# Patient Record
(demographics unavailable — no encounter records)

---

## 2025-01-29 NOTE — PHYSICAL EXAM
[Capable of only limited self care, confined to bed or chair more than 50% of waking hours] : Status 3- Capable of only limited self care, confined to bed or chair more than 50% of waking hours [Normal] : grossly intact [de-identified] : Seen on the wheelchair, however able to walk in the hallway with assistance [de-identified] : Wearing spine support

## 2025-01-29 NOTE — PHYSICAL EXAM
[Capable of only limited self care, confined to bed or chair more than 50% of waking hours] : Status 3- Capable of only limited self care, confined to bed or chair more than 50% of waking hours [Normal] : grossly intact [de-identified] : Seen on the wheelchair, however able to walk in the hallway with assistance [de-identified] : Wearing spine support

## 2025-01-29 NOTE — BEGINNING OF VISIT
[0] : 2) Feeling down, depressed, or hopeless: Not at all (0) [PHQ-2 Negative] : PHQ-2 Negative [PHQ-9 Negative] : PHQ-9 Negative [Pain Scale: ___] : On a scale of 1-10, today the patient's pain is a(n) [unfilled]. [Former] : Former [[id number]] : 0 [de-identified] : Pt has stopped smoking about a month ago

## 2025-01-29 NOTE — PHYSICAL EXAM
[Capable of only limited self care, confined to bed or chair more than 50% of waking hours] : Status 3- Capable of only limited self care, confined to bed or chair more than 50% of waking hours [Normal] : grossly intact [de-identified] : Seen on the wheelchair, however able to walk in the hallway with assistance [de-identified] : Wearing spine support

## 2025-01-29 NOTE — BEGINNING OF VISIT
[0] : 2) Feeling down, depressed, or hopeless: Not at all (0) [PHQ-2 Negative] : PHQ-2 Negative [PHQ-9 Negative] : PHQ-9 Negative [Pain Scale: ___] : On a scale of 1-10, today the patient's pain is a(n) [unfilled]. [Former] : Former [[id number]] : 0 [de-identified] : Pt has stopped smoking about a month ago

## 2025-01-29 NOTE — HISTORY OF PRESENT ILLNESS
[de-identified] : Ms. Kassi Cifuentes is 67 y/o female with right upper lung adenocarcinoma here for hospital follow up  Patient with hx of intracranial aneurysm (diag ) s/p clipping at [facility], active smoker who presented at [facility] ED 2024 c/o back pain and difficulty ambulating.  2024 CT Lumbar Age indeterminate mild anterior wedge compression fracture deformity of T11. Mild retropulsion of the posterosuperior corner of the vertebral body into the spinal canal. 4.6 cm indeterminate low-attenuation lesion within the spleen. A neoplastic lesion is not excluded. Recommend a dedicated contrast-enhanced abdomen and pelvis CT study for further evaluation.  1/3/2025 CT C/A/P showed 2.4 x 3.7 cm right upper lobe cavitary mass with irregular nodular wall thickening concerning for malignancy. Infection less likely however is not excluded. 1 cm left upper lobe spiculated nodule suspicious for malignancy (3:47). Emphysema.Mild right hilar adenopathy indeterminate for malignancy.4.3 x 3.1 x 3.7 cm septated splenic cyst.Constipated colon. Colonic diverticulosis without diverticulitis. Age-indeterminate T11 compression fracture with 3 to 4 mm osseous retropulsion, possibly acute on chronic.   2025 CT Thoracic 1.   T11 vertebral body compression fracture deformity with 3-4 mm retropulsion and loss of 50% of vertebral body height, indeterminate in age but possibly acute on chronic. 2.   Incompletely visualized irregular consolidation density parenchymal  opacity in the right upper lobe an incompletely visualized nodular  consolidation density in the left upper lobe. Recommend chest CT for further  evaluation.  1/3/2025 bone scan Focal uptake across vertebral body T11, corresponding to the compression deformity seen on recent CT. Of note, fractures on bone scan can demonstrate uptake for many months.  2025 CT head No CT evidence of intracranial metastasis.  2025  VERTREBRAL BODY, T11, BIOPSY: 	-  Bone with reactive changes of the marrow and evidence of bone remodeling 	-  Negative for carcinoma Negative: Pancytokeratin, cytokeratin 7 and TTF1   2025 A. LUNG, RIGHT UPPER LOBE, BIOPSY: Poorly-differentiated carcinoma, most favored adenocarcinoma of lung origin.   Immunoprofile of carcinoma (A1): P63 and P40: Patchy to no staining of carcinoma, respectively TTF1: Patchy strong staining of carcinoma   Patient is a 65-year-old female with a PMHx of hypothyroidism and HLD who presented to [facility] in the emergency room due to severe back pain that led to the incidental findings of her lung mass on 25. Patient cannot recall how she was referred to hematology-oncology. Patient is a poor historian.  Menarche: 14 years old  Menopause: Patient stated her menopause at an early age of 37.  G2,P0 Hysterectomy: Never  Oophorectomy: Never  Oral Contraceptives: 18 years old till about 36years old  Hormone Replacement Therapy: Last pap/pelvic exam: 35 years ago  Related family history:  Occupation: Customer service for [employer]   Genetic testing: Never   Diet: Regular everything but seafood  Previous Tx Hx: N/A Health Maintenance: Colonoscopy: 25 years ago  Endoscopy:  26 years ago  Mammo/US: 25 years ago Gyn: 25 years ago  DVTs: N/A Anticoagulant Hx: n/a Sleep Apnea: Yes  Recent Hospitalizations: 25 - 25 to a rehab facility ([facility] and nursing [place])  Vaccinations: Flu & Covid Vaccinated    Past med hx: T11 fracture    Past sug hx: Brain Aneurysm open brain surgery 10/2008  Related family history: Mother:  at 83yo. Patient doesn't remember cause of death  Father:  at 85 yo. Patient passed away from a PE Brother:  at 54 years old from colon cancer.  Brother:  at 68 years old from heart attack Cousin:  from lung cancer    Smokin/2 pack/day starting from when she was 15 years old until 24 until she was admitted and is now wearing a patch. ETOH: beer 1-2 cups daily  Illicit drug use: Never         [de-identified] : Patient is seen today for follow up She is accompanied by her friend Yasmine  She is currently at [facility] Reports that her rehab was delayed due to norovirus GI infection x 4 days  Lung, Right Upper Lobe, biopsy Poorly-differentiated carcinoma, mostly favored adenocarcnima of lung origin.   VERTREBRAL BODY, T11, BIOPSY: -  Bone with reactive changes of the marrow and evidence of bone remodeling -  Negative for carcinoma

## 2025-01-29 NOTE — PHYSICAL EXAM
[Capable of only limited self care, confined to bed or chair more than 50% of waking hours] : Status 3- Capable of only limited self care, confined to bed or chair more than 50% of waking hours [Normal] : grossly intact [de-identified] : Seen on the wheelchair, however able to walk in the hallway with assistance [de-identified] : Wearing spine support

## 2025-01-29 NOTE — HISTORY OF PRESENT ILLNESS
[de-identified] : Ms. Kassi Cifuentes is 65 y/o female with right upper lung adenocarcinoma here for hospital follow up  Patient with hx of intracranial aneurysm (diag ) s/p clipping at [facility], active smoker who presented at [facility] ED 2024 c/o back pain and difficulty ambulating.  2024 CT Lumbar Age indeterminate mild anterior wedge compression fracture deformity of T11. Mild retropulsion of the posterosuperior corner of the vertebral body into the spinal canal. 4.6 cm indeterminate low-attenuation lesion within the spleen. A neoplastic lesion is not excluded. Recommend a dedicated contrast-enhanced abdomen and pelvis CT study for further evaluation.  1/3/2025 CT C/A/P showed 2.4 x 3.7 cm right upper lobe cavitary mass with irregular nodular wall thickening concerning for malignancy. Infection less likely however is not excluded. 1 cm left upper lobe spiculated nodule suspicious for malignancy (3:47). Emphysema.Mild right hilar adenopathy indeterminate for malignancy.4.3 x 3.1 x 3.7 cm septated splenic cyst.Constipated colon. Colonic diverticulosis without diverticulitis. Age-indeterminate T11 compression fracture with 3 to 4 mm osseous retropulsion, possibly acute on chronic.   2025 CT Thoracic 1.   T11 vertebral body compression fracture deformity with 3-4 mm retropulsion and loss of 50% of vertebral body height, indeterminate in age but possibly acute on chronic. 2.   Incompletely visualized irregular consolidation density parenchymal  opacity in the right upper lobe an incompletely visualized nodular  consolidation density in the left upper lobe. Recommend chest CT for further  evaluation.  1/3/2025 bone scan Focal uptake across vertebral body T11, corresponding to the compression deformity seen on recent CT. Of note, fractures on bone scan can demonstrate uptake for many months.  2025 CT head No CT evidence of intracranial metastasis.  2025  VERTREBRAL BODY, T11, BIOPSY: 	-  Bone with reactive changes of the marrow and evidence of bone remodeling 	-  Negative for carcinoma Negative: Pancytokeratin, cytokeratin 7 and TTF1   2025 A. LUNG, RIGHT UPPER LOBE, BIOPSY: Poorly-differentiated carcinoma, most favored adenocarcinoma of lung origin.   Immunoprofile of carcinoma (A1): P63 and P40: Patchy to no staining of carcinoma, respectively TTF1: Patchy strong staining of carcinoma   Patient is a 65-year-old female with a PMHx of hypothyroidism and HLD who presented to [facility] in the emergency room due to severe back pain that led to the incidental findings of her lung mass on 25. Patient cannot recall how she was referred to hematology-oncology. Patient is a poor historian.  Menarche: 14 years old  Menopause: Patient stated her menopause at an early age of 37.  G2,P0 Hysterectomy: Never  Oophorectomy: Never  Oral Contraceptives: 18 years old till about 36years old  Hormone Replacement Therapy: Last pap/pelvic exam: 35 years ago  Related family history:  Occupation: Customer service for [employer]   Genetic testing: Never   Diet: Regular everything but seafood  Previous Tx Hx: N/A Health Maintenance: Colonoscopy: 25 years ago  Endoscopy:  26 years ago  Mammo/US: 25 years ago Gyn: 25 years ago  DVTs: N/A Anticoagulant Hx: n/a Sleep Apnea: Yes  Recent Hospitalizations: 25 - 25 to a rehab facility ([facility] and nursing [place])  Vaccinations: Flu & Covid Vaccinated    Past med hx: T11 fracture    Past sug hx: Brain Aneurysm open brain surgery 10/2008  Related family history: Mother:  at 83yo. Patient doesn't remember cause of death  Father:  at 85 yo. Patient passed away from a PE Brother:  at 54 years old from colon cancer.  Brother:  at 68 years old from heart attack Cousin:  from lung cancer    Smokin/2 pack/day starting from when she was 15 years old until 24 until she was admitted and is now wearing a patch. ETOH: beer 1-2 cups daily  Illicit drug use: Never         [de-identified] : Patient is seen today for follow up She is accompanied by her friend Yasmine  She is currently at [facility] Reports that her rehab was delayed due to norovirus GI infection x 4 days  Lung, Right Upper Lobe, biopsy Poorly-differentiated carcinoma, mostly favored adenocarcnima of lung origin.   VERTREBRAL BODY, T11, BIOPSY: -  Bone with reactive changes of the marrow and evidence of bone remodeling -  Negative for carcinoma

## 2025-01-29 NOTE — ASSESSMENT
[FreeTextEntry1] : Right upper lobe lung poorly differentiated adenocarcinoma NGS panel pending CT CAP [1/3/2025]: 3.7 cm right upper lobe cavitary mass, mild right hilar adenopathy.  Age indeterminate T11 compression fracture.  Left upper lobe spiculated nodule 1 cm CT head: No evidence of metastasis Cannot get MRI due to intracranial aneurysm clip  Discussed at length about the diagnosis, work up, staging, prognosis and treatment options Discussed at length about the finding, I explained to the patient that we need to biopsy the left lung mass for appropriate staging If the left lung mass is metastasis from the right lung adenocarcinoma, this would be stage IV disease which is incurable.  However if this is a second primary, she can possibly receive multimodality treatment to attempt to cure Patient reports that she would not undergo any further biopsies.  She reports that she had always thought that if she ever developed cancer she would let it be.  I explained to the patient about the advances in the field of thoracic oncology which is led to improvement in survival even for stage IV cancer. We briefly discussed about treatment options including chemotherapy, immunotherapy and targeted therapy I explained to her that she can always stage her cancer and know about the treatment options before decided to pursue them or not.  Even after she pursues the treatment if she changes her mind, we can discontinue treatment. Patient's friend at bedside tries to explain to the patient the same thing.  Later, the friend told me that patient is stubborn and eventually would do what she wants to do. At this point patient is agreeable to doing blood work today and obtaining PET scan. She will only agree to biopsy after the PET scan Plan: Send blood work including CBC, CMP, LDH, CEA, liquid biopsy Requested pathology to send NGS panel from the right lung biopsy PET/CT Smoking cessation counseled at length  Social history: Currently in [facility] rehab Lives alone in [place].  Has 2 sister 1 of whom lives nearby Smokin/2 pack/day starting from when she was 15 years old until 24 until she was admitted and is now wearing a patch. ETOH: beer 1-2 cups daily Illicit drug use: Never.  Patient and friend Yasmine had multiple questions which were answered to satisfaction  Follow-up in 7 to 10 days No labs

## 2025-01-29 NOTE — ASSESSMENT
[FreeTextEntry1] : Right upper lobe lung poorly differentiated adenocarcinoma NGS panel pending CT CAP [1/3/2025]: 3.7 cm right upper lobe cavitary mass, mild right hilar adenopathy.  Age indeterminate T11 compression fracture.  Left upper lobe spiculated nodule 1 cm CT head: No evidence of metastasis Cannot get MRI due to intracranial aneurysm clip  Discussed at length about the diagnosis, work up, staging, prognosis and treatment options Discussed at length about the finding, I explained to the patient that we need to biopsy the left lung mass for appropriate staging If the left lung mass is metastasis from the right lung adenocarcinoma, this would be stage IV disease which is incurable.  However if this is a second primary, she can possibly receive multimodality treatment to attempt to cure Patient reports that she would not undergo any further biopsies.  She reports that she had always thought that if she ever developed cancer she would let it be.  I explained to the patient about the advances in the field of thoracic oncology which is led to improvement in survival even for stage IV cancer. We briefly discussed about treatment options including chemotherapy, immunotherapy and targeted therapy I explained to her that she can always stage her cancer and know about the treatment options before decided to pursue them or not.  Even after she pursues the treatment if she changes her mind, we can discontinue treatment. Patient's friend at bedside tries to explain to the patient the same thing.  Later, the friend told me that patient is stubborn and eventually would do what she wants to do. At this point patient is agreeable to doing blood work today and obtaining PET scan. She will only agree to biopsy after the PET scan Plan: Send blood work including CBC, CMP, LDH, CEA, liquid biopsy Requested pathology to send NGS panel from the right lung biopsy PET/CT Smoking cessation counseled at length  Social history: Currently in [facility] rehab Lives alone in [place].  Has 2 sister 1 of whom lives nearby Smokin/2 pack/day starting from when she was 15 years old until 24 until she was admitted and is now wearing a patch. ETOH: beer 1-2 cups daily Illicit drug use: Never.  Patient and friend Yasmine had multiple questions which were answered to satisfaction  Follow-up in 7 to 10 days No labs

## 2025-01-29 NOTE — BEGINNING OF VISIT
[0] : 2) Feeling down, depressed, or hopeless: Not at all (0) [PHQ-2 Negative] : PHQ-2 Negative [PHQ-9 Negative] : PHQ-9 Negative [Pain Scale: ___] : On a scale of 1-10, today the patient's pain is a(n) [unfilled]. [Former] : Former [[id number]] : 0 [de-identified] : Pt has stopped smoking about a month ago

## 2025-01-29 NOTE — HISTORY OF PRESENT ILLNESS
[de-identified] : Ms. Kassi Cifuentes is 65 y/o female with right upper lung adenocarcinoma here for hospital follow up  Patient with hx of intracranial aneurysm (diag ) s/p clipping at [facility], active smoker who presented at [facility] ED 2024 c/o back pain and difficulty ambulating.  2024 CT Lumbar Age indeterminate mild anterior wedge compression fracture deformity of T11. Mild retropulsion of the posterosuperior corner of the vertebral body into the spinal canal. 4.6 cm indeterminate low-attenuation lesion within the spleen. A neoplastic lesion is not excluded. Recommend a dedicated contrast-enhanced abdomen and pelvis CT study for further evaluation.  1/3/2025 CT C/A/P showed 2.4 x 3.7 cm right upper lobe cavitary mass with irregular nodular wall thickening concerning for malignancy. Infection less likely however is not excluded. 1 cm left upper lobe spiculated nodule suspicious for malignancy (3:47). Emphysema.Mild right hilar adenopathy indeterminate for malignancy.4.3 x 3.1 x 3.7 cm septated splenic cyst.Constipated colon. Colonic diverticulosis without diverticulitis. Age-indeterminate T11 compression fracture with 3 to 4 mm osseous retropulsion, possibly acute on chronic.   2025 CT Thoracic 1.   T11 vertebral body compression fracture deformity with 3-4 mm retropulsion and loss of 50% of vertebral body height, indeterminate in age but possibly acute on chronic. 2.   Incompletely visualized irregular consolidation density parenchymal  opacity in the right upper lobe an incompletely visualized nodular  consolidation density in the left upper lobe. Recommend chest CT for further  evaluation.  1/3/2025 bone scan Focal uptake across vertebral body T11, corresponding to the compression deformity seen on recent CT. Of note, fractures on bone scan can demonstrate uptake for many months.  2025 CT head No CT evidence of intracranial metastasis.  2025  VERTREBRAL BODY, T11, BIOPSY: 	-  Bone with reactive changes of the marrow and evidence of bone remodeling 	-  Negative for carcinoma Negative: Pancytokeratin, cytokeratin 7 and TTF1   2025 A. LUNG, RIGHT UPPER LOBE, BIOPSY: Poorly-differentiated carcinoma, most favored adenocarcinoma of lung origin.   Immunoprofile of carcinoma (A1): P63 and P40: Patchy to no staining of carcinoma, respectively TTF1: Patchy strong staining of carcinoma   Patient is a 65-year-old female with a PMHx of hypothyroidism and HLD who presented to [facility] in the emergency room due to severe back pain that led to the incidental findings of her lung mass on 25. Patient cannot recall how she was referred to hematology-oncology. Patient is a poor historian.  Menarche: 14 years old  Menopause: Patient stated her menopause at an early age of 37.  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 Patient doesn't remember cause of death  Father:  at 85 yo. Patient passed away from a PE Brother:  at 54 years old from colon cancer.  Brother:  at 68 years old from heart attack Cousin:  from lung cancer    Smokin/2 pack/day starting from when she was 15 years old until 24 until she was admitted and is now wearing a patch. ETOH: beer 1-2 cups daily  Illicit drug use: Never         [de-identified] : Patient is seen today for follow up She is accompanied by her friend Yasmine  She is currently at [facility] Reports that her rehab was delayed due to norovirus GI infection x 4 days  Lung, Right Upper Lobe, biopsy Poorly-differentiated carcinoma, mostly favored adenocarcnima of lung origin.   VERTREBRAL BODY, T11, BIOPSY: -  Bone with reactive changes of the marrow and evidence of bone remodeling -  Negative for carcinoma

## 2025-01-29 NOTE — SOCIAL HISTORY
[Patient advised of risk of tobacco use and smoking cessation discussed.] : Patient advised of risk of tobacco use and smoking cessation discussed. English

## 2025-01-29 NOTE — ASSESSMENT
[FreeTextEntry1] : Right upper lobe lung poorly differentiated adenocarcinoma NGS panel pending CT CAP [1/3/2025]: 3.7 cm right upper lobe cavitary mass, mild right hilar adenopathy.  Age indeterminate T11 compression fracture.  Left upper lobe spiculated nodule 1 cm CT head: No evidence of metastasis Cannot get MRI due to intracranial aneurysm clip  Discussed at length about the diagnosis, work up, staging, prognosis and treatment options Discussed at length about the finding, I explained to the patient that we need to biopsy the left lung mass for appropriate staging If the left lung mass is metastasis from the right lung adenocarcinoma, this would be stage IV disease which is incurable.  However if this is a second primary, she can possibly receive multimodality treatment to attempt to cure Patient reports that she would not undergo any further biopsies.  She reports that she had always thought that if she ever developed cancer she would let it be.  I explained to the patient about the advances in the field of thoracic oncology which is led to improvement in survival even for stage IV cancer. We briefly discussed about treatment options including chemotherapy, immunotherapy and targeted therapy I explained to her that she can always stage her cancer and know about the treatment options before decided to pursue them or not.  Even after she pursues the treatment if she changes her mind, we can discontinue treatment. Patient's friend at bedside tries to explain to the patient the same thing.  Later, the friend told me that patient is stubborn and eventually would do what she wants to do. At this point patient is agreeable to doing blood work today and obtaining PET scan. She will only agree to biopsy after the PET scan Plan: Send blood work including CBC, CMP, LDH, CEA, liquid biopsy Requested pathology to send NGS panel from the right lung biopsy PET/CT Smoking cessation counseled at length  Social history: Currently in [facility] rehab Lives alone in [place].  Has 2 sister 1 of whom lives nearby Smokin/2 pack/day starting from when she was 15 years old until 24 until she was admitted and is now wearing a patch. ETOH: beer 1-2 cups daily Illicit drug use: Never.  Patient and friend Yasmine had multiple questions which were answered to satisfaction  Follow-up in 7 to 10 days No labs

## 2025-01-29 NOTE — BEGINNING OF VISIT
[0] : 2) Feeling down, depressed, or hopeless: Not at all (0) [PHQ-2 Negative] : PHQ-2 Negative [PHQ-9 Negative] : PHQ-9 Negative [Pain Scale: ___] : On a scale of 1-10, today the patient's pain is a(n) [unfilled]. [Former] : Former [[id number]] : 0 [de-identified] : Pt has stopped smoking about a month ago

## 2025-01-29 NOTE — HISTORY OF PRESENT ILLNESS
[de-identified] : Ms. Kassi Cifuentes is 65 y/o female with right upper lung adenocarcinoma here for hospital follow up  Patient with hx of intracranial aneurysm (diag ) s/p clipping at [facility], active smoker who presented at [place] [facility] 2024 c/o back pain and difficulty ambulating.  2024 CT Lumbar Age indeterminate mild anterior wedge compression fracture deformity of T11. Mild retropulsion of the posterosuperior corner of the vertebral body into the spinal canal. 4.6 cm indeterminate low-attenuation lesion within the spleen. A neoplastic lesion is not excluded. Recommend a dedicated contrast-enhanced abdomen and pelvis CT study for further evaluation.  1/3/2025 CT C/A/P showed 2.4 x 3.7 cm right upper lobe cavitary mass with irregular nodular wall thickening concerning for malignancy. Infection less likely however is not excluded. 1 cm left upper lobe spiculated nodule suspicious for malignancy (3:47). Emphysema.Mild right hilar adenopathy indeterminate for malignancy.4.3 x 3.1 x 3.7 cm septated splenic cyst.Constipated colon. Colonic diverticulosis without diverticulitis. Age-indeterminate T11 compression fracture with 3 to 4 mm osseous retropulsion, possibly acute on chronic.   2025 CT Thoracic 1.   T11 vertebral body compression fracture deformity with 3-4 mm retropulsion and loss of 50% of vertebral body height, indeterminate in age but possibly acute on chronic. 2.   Incompletely visualized irregular consolidation density parenchymal  opacity in the right upper lobe an incompletely visualized nodular  consolidation density in the left upper lobe. Recommend chest CT for further  evaluation.  1/3/2025 bone scan Focal uptake across vertebral body T11, corresponding to the compression deformity seen on recent CT. Of note, fractures on bone scan can demonstrate uptake for many months.  2025 CT head No CT evidence of intracranial metastasis.  2025  VERTREBRAL BODY, T11, BIOPSY: 	-  Bone with reactive changes of the marrow and evidence of bone remodeling 	-  Negative for carcinoma Negative: Pancytokeratin, cytokeratin 7 and TTF1   2025 A. LUNG, RIGHT UPPER LOBE, BIOPSY: Poorly-differentiated carcinoma, most favored adenocarcinoma of lung origin.   Immunoprofile of carcinoma (A1): P63 and P40: Patchy to no staining of carcinoma, respectively TTF1: Patchy strong staining of carcinoma   Patient is a 65-year-old female with a PMHx of hypothyroidism and HLD who presented to [facility] in the emergency room due to severe back pain that led to the incidental findings of her lung mass on 25. Patient cannot recall how she was referred to hematology-oncology. Patient is a poor historian.  Menarche: 14 years old  Menopause: Patient stated her menopause at an early age of 37.  G2,P0 Hysterectomy: Never  Oophorectomy: Never  Oral Contraceptives: 18 years old till about 36years old  Hormone Replacement Therapy: Last pap/pelvic exam: 35 years ago  Related family history:  Occupation: Customer service for [employer]   Genetic testing: Never   Diet: Regular everything but seafood  Previous Tx Hx: N/A Health Maintenance: Colonoscopy: 25 years ago  Endoscopy:  26 years ago  Mammo/US: 25 years ago Gyn: 25 years ago  DVTs: N/A Anticoagulant Hx: n/a Sleep Apnea: Yes  Recent Hospitalizations: 25 - 25 to a rehab facility ([facility] and nursing [place])  Vaccinations: Flu & Covid Vaccinated    Past med hx: T11 fracture    Past sug hx: Brain Aneurysm open brain surgery 10/2008  Related family history: Mother:  at 81yo. Patient doesn't remember cause of death  Father:  at 87 yo. Patient passed away from a PE Brother:  at 54 years old from colon cancer.  Brother:  at 68 years old from heart attack Cousin:  from lung cancer    Smokin/2 pack/day starting from when she was 15 years old until 24 until she was admitted and is now wearing a patch. ETOH: beer 1-2 cups daily  Illicit drug use: Never         [de-identified] : Patient is seen today for follow up She is accompanied by her friend Yasmine  She is currently at [facility] Reports that her rehab was delayed due to norovirus GI infection x 4 days  Lung, Right Upper Lobe, biopsy Poorly-differentiated carcinoma, mostly favored adenocarcnima of lung origin.   VERTREBRAL BODY, T11, BIOPSY: -  Bone with reactive changes of the marrow and evidence of bone remodeling -  Negative for carcinoma

## 2025-02-10 NOTE — HISTORY OF PRESENT ILLNESS
[FreeTextEntry1] : FATOUMATA SHEPHERD is a 65 year old female with a PMH of cerebral aneurysm clipping in 2008 (Dr. Roel Copeland at [facility]) presented with seizures, who presents to the office today for neurosurgical hospital follow up due to T11 compression fracture.   She was seen on 12/27/25 at [facility] after  TLSO brace placed 12/31. CT thoracic and lumbar spine shows T11 compression fracture with mild retropulsion. He underwent kyphoplasty and bone biopsy with Dr. Coronado on 1/6/25.  Surg Hx:    Meds:    Allergies: Iodine, tetracycline  Soc Hx: current smoker, ***EtOH, lives with ***, works as ***  65 year old female with PMHx of cerebral aneurysm clipping in 2008 (by Dr. Roel Copeland in [facility] in [place]), patient explains she presented with seizures at the time and was found to have an aneurysm and went to [facility] because she found a specialist. Says that she followed up after the incident to have staples removed but has not followed up since. She says that it is difficult for her to get to the city. She was on Keppra since her incident but had not had a seizure and keppra was stopped by her PCP. She says that she has been seeing a PCP yearly and currently in the process of switching PCPs but her last visit was March of last year. She also has history of hypothyroidism was previously on Synthroid, which was also stopped by her PCP. Patient is also a current smoker but denies any other medical problems. Patient says that she has been experiencing pain her back. Three days ago, she got up from a seated position on her cough and was in excruciating pain in the middle to lower part of her back. She doesn't recall any trauma or falls. Patient says she was never told she has osteoporosis. She recalls being offered DEXA by PCP but refused many years ago. She says that at baseline she is a little weaker with her LLE which has been the same since her aneurysm clipping. She is able to ambulate adequately at home and will occasionally use a cane if needed. Patient denies any numbness or tingling or changes in bowel or bladder symptoms. Says that she has not had a BM in a few days because the pain will not allow her to move around and she has not eaten much in the last few days. She denies any other complaints.

## 2025-02-10 NOTE — HISTORY OF PRESENT ILLNESS
[FreeTextEntry1] : FATOUMATA SHEPHERD is a 65 year old female with a PMH of cerebral aneurysm clipping in 2008 (Dr. Roel Copeland at ) presented with seizures, who presents to the office today for neurosurgical hospital follow up due to T11 compression fracture.   She was seen on 12/27/25 at [facility] after  TLSO brace placed 12/31. CT thoracic and lumbar spine shows T11 compression fracture with mild retropulsion. He underwent kyphoplasty and bone biopsy with Dr. Coronado on 1/6/25.  Surg Hx:    Meds:    Allergies: Iodine, tetracycline  Soc Hx: current smoker, ***EtOH, lives with ***, works as ***  65 year old female with PMHx of cerebral aneurysm clipping in 2008 (by Dr. Roel Copeland in [facility] in [place]), patient explains she presented with seizures at the time and was found to have an aneurysm and went to [facility] because she found a specialist. Says that she followed up after the incident to have staples removed but has not followed up since. She says that it is difficult for her to get to the city. She was on Keppra since her incident but had not had a seizure and keppra was stopped by her PCP. She says that she has been seeing a PCP yearly and currently in the process of switching PCPs but her last visit was March of last year. She also has history of hypothyroidism was previously on Synthroid, which was also stopped by her PCP. Patient is also a current smoker but denies any other medical problems. Patient says that she has been experiencing pain her back. Three days ago, she got up from a seated position on her cough and was in excruciating pain in the middle to lower part of her back. She doesn't recall any trauma or falls. Patient says she was never told she has osteoporosis. She recalls being offered DEXA by PCP but refused many years ago. She says that at baseline she is a little weaker with her LLE which has been the same since her aneurysm clipping. She is able to ambulate adequately at home and will occasionally use a cane if needed. Patient denies any numbness or tingling or changes in bowel or bladder symptoms. Says that she has not had a BM in a few days because the pain will not allow her to move around and she has not eaten much in the last few days. She denies any other complaints.

## 2025-03-05 NOTE — ASSESSMENT
[FreeTextEntry1] : Right upper lobe lung poorly differentiated adenocarcinoma PD-L1 70% NGS: [id number], CDKN2A, Tp53, STK 11 mutations.  TMB high.  FABI CT CAP [1/3/2025]: 3.7 cm right upper lobe cavitary mass, mild right hilar adenopathy.  Age indeterminate T11 compression fracture.  Left upper lobe spiculated nodule 1 cm CT head: No evidence of metastasis Cannot get MRI due to intracranial aneurysm clip  She is seen today for follow-up She is now back home and relatively better PET/CT [2025]: 4 cm right upper lobe lung mass, 1.2 cm right lower lobe nodule, 1 cm left lateral upper lobe nodule, 0.8 cm left lower lobe nodule.  No additional sites of pathological FDG uptake I explained to the patient that the bone lesions did not light up.  She has 4 nodules out of which right upper lobe and the left upper lobe are concerning for malignancy. The right lower lobe nodule and the left lower lobe nodule could be infectious versus inflammatory and malignancy cannot be ruled out Patient during her previous visit had expressed interest to not pursue any management. I offered her 2 Options biopsy BROOKS mass biopsy vs IOT and follow CT chest 3 months versus palliative care My recommendation would be to biopsy the left upper lobe mass and present her case in tumor board She is agreeable to pursuing left upper lobe mass biopsy Also reviewed the molecular testing and since she has high PD-L1, she would be a good candidate for checkpoint inhibitor therapy She understands Plan: Refer to IR for biopsy of the left upper lobe mass Present her case in multispecialty tumor board to discuss the right lower lobe and the left lower lobe nodules Smoking cessation counseled at length  Social history: Currently in [facility] rehab Lives alone in [place].  Has 2 sister 1 of whom lives nearby Smokin/2 pack/day starting from when she was 15 years old until 24 until she was admitted and is now wearing a patch. ETOH: beer 1-2 cups daily Illicit drug use: Never.  Patient and friend Yasmine had multiple questions which were answered to satisfaction  Follow-up after the left upper lobe mass biopsy No labs

## 2025-03-05 NOTE — PHYSICAL EXAM
[Capable of only limited self care, confined to bed or chair more than 50% of waking hours] : Status 3- Capable of only limited self care, confined to bed or chair more than 50% of waking hours [Normal] : grossly intact [de-identified] : Seen on the wheelchair, however able to walk in the hallway with assistance [de-identified] : Wearing spine support

## 2025-03-05 NOTE — PHYSICAL EXAM
[Capable of only limited self care, confined to bed or chair more than 50% of waking hours] : Status 3- Capable of only limited self care, confined to bed or chair more than 50% of waking hours [Normal] : grossly intact [de-identified] : Seen on the wheelchair, however able to walk in the hallway with assistance [de-identified] : Wearing spine support

## 2025-03-05 NOTE — ASSESSMENT
[FreeTextEntry1] : Right upper lobe lung poorly differentiated adenocarcinoma PD-L1 70% NGS: [id number], CDKN2A, Tp53, STK 11 mutations.  TMB high.  FABI CT CAP [1/3/2025]: 3.7 cm right upper lobe cavitary mass, mild right hilar adenopathy.  Age indeterminate T11 compression fracture.  Left upper lobe spiculated nodule 1 cm CT head: No evidence of metastasis Cannot get MRI due to intracranial aneurysm clip  She is seen today for follow-up She is now back home and relatively better PET/CT [2025]: 4 cm right upper lobe lung mass, 1.2 cm right lower lobe nodule, 1 cm left lateral upper lobe nodule, 0.8 cm left lower lobe nodule.  No additional sites of pathological FDG uptake I explained to the patient that the bone lesions did not light up.  She has 4 nodules out of which right upper lobe and the left upper lobe are concerning for malignancy. The right lower lobe nodule and the left lower lobe nodule could be infectious versus inflammatory and malignancy cannot be ruled out Patient during her previous visit had expressed interest to not pursue any management. I offered her 2 Options biopsy BROOKS mass biopsy vs IOT and follow CT chest 3 months versus palliative care My recommendation would be to biopsy the left upper lobe mass and present her case in tumor board She is agreeable to pursuing left upper lobe mass biopsy Also reviewed the molecular testing and since she has high PD-L1, she would be a good candidate for checkpoint inhibitor therapy She understands Plan: Refer to IR for biopsy of the left upper lobe mass Present her case in multispecialty tumor board to discuss the right lower lobe and the left lower lobe nodules Smoking cessation counseled at length  Social history: Currently in [facility] rehab Lives alone in [place].  Has 2 sister 1 of whom lives nearby Smokin/2 pack/day starting from when she was 15 years old until 24 until she was admitted and is now wearing a patch. ETOH: beer 1-2 cups daily Illicit drug use: Never.  Patient and friend Yasmine had multiple questions which were answered to satisfaction  Follow-up after the left upper lobe mass biopsy No labs

## 2025-03-05 NOTE — HISTORY OF PRESENT ILLNESS
[de-identified] : Ms. Kassi Cifuentes is 67 y/o female with right upper lung adenocarcinoma here for hospital follow up  Patient with hx of intracranial aneurysm (diag ) s/p clipping at [facility], active smoker who presented at [facility] 2024 c/o back pain and difficulty ambulating.  2024 CT Lumbar Age indeterminate mild anterior wedge compression fracture deformity of T11. Mild retropulsion of the posterosuperior corner of the vertebral body into the spinal canal. 4.6 cm indeterminate low-attenuation lesion within the spleen. A neoplastic lesion is not excluded. Recommend a dedicated contrast-enhanced abdomen and pelvis CT study for further evaluation.  1/3/2025 CT C/A/P showed 2.4 x 3.7 cm right upper lobe cavitary mass with irregular nodular wall thickening concerning for malignancy. Infection less likely however is not excluded. 1 cm left upper lobe spiculated nodule suspicious for malignancy (3:47). Emphysema.Mild right hilar adenopathy indeterminate for malignancy.4.3 x 3.1 x 3.7 cm septated splenic cyst.Constipated colon. Colonic diverticulosis without diverticulitis. Age-indeterminate T11 compression fracture with 3 to 4 mm osseous retropulsion, possibly acute on chronic.   2025 CT Thoracic 1.   T11 vertebral body compression fracture deformity with 3-4 mm retropulsion and loss of 50% of vertebral body height, indeterminate in age but possibly acute on chronic. 2.   Incompletely visualized irregular consolidation density parenchymal  opacity in the right upper lobe an incompletely visualized nodular  consolidation density in the left upper lobe. Recommend chest CT for further  evaluation.  1/3/2025 bone scan Focal uptake across vertebral body T11, corresponding to the compression deformity seen on recent CT. Of note, fractures on bone scan can demonstrate uptake for many months.  2025 CT head No CT evidence of intracranial metastasis.  2025  VERTREBRAL BODY, T11, BIOPSY: 	-  Bone with reactive changes of the marrow and evidence of bone remodeling 	-  Negative for carcinoma Negative: Pancytokeratin, cytokeratin 7 and TTF1   2025 A. LUNG, RIGHT UPPER LOBE, BIOPSY: Poorly-differentiated carcinoma, most favored adenocarcinoma of lung origin.   Immunoprofile of carcinoma (A1): P63 and P40: Patchy to no staining of carcinoma, respectively TTF1: Patchy strong staining of carcinoma   Patient is a 65-year-old female with a PMHx of hypothyroidism and HLD who presented to [facility] in the emergency room due to severe back pain that led to the incidental findings of her lung mass on 25. Patient cannot recall how she was referred to hematology-oncology. Patient is a poor historian.  Menarche: 14 years old  Menopause: Patient stated her menopause at an early age of 37.  G2,P0 Hysterectomy: Never  Oophorectomy: Never  Oral Contraceptives: 18 years old till about 36years old  Hormone Replacement Therapy: Last pap/pelvic exam: 35 years ago  Related family history:  Occupation: Customer service for [employer]   Genetic testing: Never   Diet: Regular everything but seafood  Previous Tx Hx: N/A Health Maintenance: Colonoscopy: 25 years ago  Endoscopy:  26 years ago  Mammo/US: 25 years ago Gyn: 25 years ago  DVTs: N/A Anticoagulant Hx: n/a Sleep Apnea: Yes  Recent Hospitalizations: 25 - 25 to a rehab facility ([facility] and nursing [place])  Vaccinations: Flu & Covid Vaccinated    Past med hx: T11 fracture    Past sug hx: Brain Aneurysm open brain surgery 10/2008  Related family history: Mother:  at 83yo. Patient doesn't remember cause of death  Father:  at 85 yo. Patient passed away from a PE Brother:  at 54 years old from colon cancer.  Brother:  at 68 years old from heart attack Cousin:  from lung cancer    Smokin/2 pack/day starting from when she was 15 years old until 24 until she was admitted and is now wearing a patch. ETOH: beer 1-2 cups daily  Illicit drug use: Never     Reports that her rehab was delayed due to norovirus GI infection x 4 days  Lung, Right Upper Lobe, biopsy Poorly-differentiated carcinoma, mostly favored adenocarcnima of lung origin.   VERTREBRAL BODY, T11, BIOPSY: -  Bone with reactive changes of the marrow and evidence of bone remodeling -  Negative for carcinoma      [de-identified] : Patient is seen today for follow up She is accompanied by her friend Yasmine  She is currently at home. Was released from [facility] 2/24/25 Pain is much better  PET SCAN 2/24/25: IMPRESSION:  1. Hypermetabolic 4 cm cavitary lung mass in the right upper lobe, corresponding to known malignancy. No hypermetabolic lymphadenopathy.  2. Hypermetabolic 1.0 cm spiculated lung nodule in the lateral left upper lobe, concerning for malignancy.  3. FDG uptake in a 1.2 cm right lower lobe nodule and a 0.8 cm left lower lobe nodule, both of which may represent infectious/inflammatory etiology, however malignancy cannot be excluded.  4. No additional sites of pathologic FDG uptake to suggest biologic tumor activity.

## 2025-03-05 NOTE — HISTORY OF PRESENT ILLNESS
[de-identified] : Ms. Kassi Cifuentes is 65 y/o female with right upper lung adenocarcinoma here for hospital follow up  Patient with hx of intracranial aneurysm (diag ) s/p clipping at [facility], active smoker who presented at [place] ED 2024 c/o back pain and difficulty ambulating.  2024 CT Lumbar Age indeterminate mild anterior wedge compression fracture deformity of T11. Mild retropulsion of the posterosuperior corner of the vertebral body into the spinal canal. 4.6 cm indeterminate low-attenuation lesion within the spleen. A neoplastic lesion is not excluded. Recommend a dedicated contrast-enhanced abdomen and pelvis CT study for further evaluation.  1/3/2025 CT C/A/P showed 2.4 x 3.7 cm right upper lobe cavitary mass with irregular nodular wall thickening concerning for malignancy. Infection less likely however is not excluded. 1 cm left upper lobe spiculated nodule suspicious for malignancy (3:47). Emphysema.Mild right hilar adenopathy indeterminate for malignancy.4.3 x 3.1 x 3.7 cm septated splenic cyst.Constipated colon. Colonic diverticulosis without diverticulitis. Age-indeterminate T11 compression fracture with 3 to 4 mm osseous retropulsion, possibly acute on chronic.   2025 CT Thoracic 1.   T11 vertebral body compression fracture deformity with 3-4 mm retropulsion and loss of 50% of vertebral body height, indeterminate in age but possibly acute on chronic. 2.   Incompletely visualized irregular consolidation density parenchymal  opacity in the right upper lobe an incompletely visualized nodular  consolidation density in the left upper lobe. Recommend chest CT for further  evaluation.  1/3/2025 bone scan Focal uptake across vertebral body T11, corresponding to the compression deformity seen on recent CT. Of note, fractures on bone scan can demonstrate uptake for many months.  2025 CT head No CT evidence of intracranial metastasis.  2025  VERTREBRAL BODY, T11, BIOPSY: 	-  Bone with reactive changes of the marrow and evidence of bone remodeling 	-  Negative for carcinoma Negative: Pancytokeratin, cytokeratin 7 and TTF1   2025 A. LUNG, RIGHT UPPER LOBE, BIOPSY: Poorly-differentiated carcinoma, most favored adenocarcinoma of lung origin.   Immunoprofile of carcinoma (A1): P63 and P40: Patchy to no staining of carcinoma, respectively TTF1: Patchy strong staining of carcinoma   Patient is a 65-year-old female with a PMHx of hypothyroidism and HLD who presented to [facility] in the emergency room due to severe back pain that led to the incidental findings of her lung mass on 25. Patient cannot recall how she was referred to hematology-oncology. Patient is a poor historian.  Menarche: 14 years old  Menopause: Patient stated her menopause at an early age of 37.  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 Patient doesn't remember cause of death  Father:  at 85 yo. Patient passed away from a PE Brother:  at 54 years old from colon cancer.  Brother:  at 68 years old from heart attack Cousin:  from lung cancer    Smokin/2 pack/day starting from when she was 15 years old until 24 until she was admitted and is now wearing a patch. ETOH: beer 1-2 cups daily  Illicit drug use: Never     Reports that her rehab was delayed due to norovirus GI infection x 4 days  Lung, Right Upper Lobe, biopsy Poorly-differentiated carcinoma, mostly favored adenocarcnima of lung origin.   VERTREBRAL BODY, T11, BIOPSY: -  Bone with reactive changes of the marrow and evidence of bone remodeling -  Negative for carcinoma      [de-identified] : Patient is seen today for follow up She is accompanied by her friend Yasmine  She is currently at home. Was released from [facility] 2/24/25 Pain is much better  PET SCAN 2/24/25: IMPRESSION:  1. Hypermetabolic 4 cm cavitary lung mass in the right upper lobe, corresponding to known malignancy. No hypermetabolic lymphadenopathy.  2. Hypermetabolic 1.0 cm spiculated lung nodule in the lateral left upper lobe, concerning for malignancy.  3. FDG uptake in a 1.2 cm right lower lobe nodule and a 0.8 cm left lower lobe nodule, both of which may represent infectious/inflammatory etiology, however malignancy cannot be excluded.  4. No additional sites of pathologic FDG uptake to suggest biologic tumor activity.

## 2025-03-25 NOTE — PHYSICAL EXAM
[Capable of only limited self care, confined to bed or chair more than 50% of waking hours] : Status 3- Capable of only limited self care, confined to bed or chair more than 50% of waking hours [Normal] : grossly intact [Ambulatory and capable of all self care but unable to carry out any work activities] : Status 2- Ambulatory and capable of all self care but unable to carry out any work activities. Up and about more than 50% of waking hours [de-identified] : Walks with cane [de-identified] : Wearing spine support

## 2025-03-25 NOTE — ASSESSMENT
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  TMB high.  FABI CT CAP [1/3/2025]: 3.7 cm right upper lobe cavitary mass, mild right hilar adenopathy.  Age indeterminate T11 compression fracture.  Left upper lobe spiculated nodule 1 cm CT head: No evidence of metastasis Cannot get MRI due to intracranial aneurysm clip PET/CT [2025]: 4 cm right upper lobe lung mass, 1.2 cm right lower lobe nodule, 1 cm left lateral upper lobe nodule, 0.8 cm left lower lobe nodule.  No additional sites of pathological FDG uptake  Left upper lobe adenocarcinoma PD-L1 3% NGS panel pending  I discussed the findings with pathology.  We reviewed the 2 lesions and histologically the 2 lesions look slightly different.  We will send NGS panel on the left upper lobe adenocarcinoma, and if it is not K-emiliana G 12C mutated, there likely 2 different primary. I explained to the patient that she likely has 2 different primaries.  However mutations do not deter mine primary versus metastatic disease I explained to the patient that the bone lesions did not light up.  She has 4 nodules out of which right upper lobe and the left upper lobe are malignant  The right lower lobe nodule and the left lower lobe nodule could be infectious versus inflammatory and malignancy cannot be ruled out Explained to the patient the best neck step would be to be seen by thoracic surgery and discussed about resection of the 2 lung cancers Patient is apprehensive about surgery and initially did not want to pursue it.  Extensive counseling done by myself and her friend at bedside I explained to the patient at least to have a visit with thoracic surgery to discuss the option, procedure, risks and recovery I explained to her that the only curative treatment for her lung cancer is surgery. If she were to decline surgery, we can consider chemotherapy plus checkpoint inhibitors but this may not be curable.  We can also consider local therapy to bilateral lung masses if she were a candidate including cryoablation versus radiation. She understands After back-and-forth, she was agreeable to seeing surgeon Plan: Refer to thoracic surgery Smoking cessation counseled at length  Social history: Currently in [facility] rehab Lives alone in [place].  Has 2 sister 1 of whom lives nearby Smokin/2 pack/day starting from when she was 15 years old until 12/27/24 until she was admitted and is now wearing a patch. ETOH: beer 1-2 cups daily Illicit drug use: Never.  Patient and friend Yasmine had multiple questions which were answered to satisfaction  Follow-up after the visit with thoracic surgery next  No labs

## 2025-03-25 NOTE — ASSESSMENT
[FreeTextEntry1] : Right upper lobe lung poorly differentiated adenocarcinoma PD-L1 70% NGS: [id number], CDKN2A, Tp53, STK 11 mutations.  TMB high.  FABI CT CAP [1/3/2025]: 3.7 cm right upper lobe cavitary mass, mild right hilar adenopathy.  Age indeterminate T11 compression fracture.  Left upper lobe spiculated nodule 1 cm CT head: No evidence of metastasis Cannot get MRI due to intracranial aneurysm clip PET/CT [2025]: 4 cm right upper lobe lung mass, 1.2 cm right lower lobe nodule, 1 cm left lateral upper lobe nodule, 0.8 cm left lower lobe nodule.  No additional sites of pathological FDG uptake  Left upper lobe adenocarcinoma PD-L1 3% NGS panel pending  I discussed the findings with pathology.  We reviewed the 2 lesions and histologically the 2 lesions look slightly different.  We will send NGS panel on the left upper lobe adenocarcinoma, and if it is not K-emiliana G 12C mutated, there likely 2 different primary. I explained to the patient that she likely has 2 different primaries.  However mutations do not deter mine primary versus metastatic disease I explained to the patient that the bone lesions did not light up.  She has 4 nodules out of which right upper lobe and the left upper lobe are malignant  The right lower lobe nodule and the left lower lobe nodule could be infectious versus inflammatory and malignancy cannot be ruled out Explained to the patient the best neck step would be to be seen by thoracic surgery and discussed about resection of the 2 lung cancers Patient is apprehensive about surgery and initially did not want to pursue it.  Extensive counseling done by myself and her friend at bedside I explained to the patient at least to have a visit with thoracic surgery to discuss the option, procedure, risks and recovery I explained to her that the only curative treatment for her lung cancer is surgery. If she were to decline surgery, we can consider chemotherapy plus checkpoint inhibitors but this may not be curable.  We can also consider local therapy to bilateral lung masses if she were a candidate including cryoablation versus radiation. She understands After back-and-forth, she was agreeable to seeing surgeon Plan: Refer to thoracic surgery Smoking cessation counseled at length  Social history: Currently in [facility] rehab Lives alone in [place].  Has 2 sister 1 of whom lives nearby Smokin/2 pack/day starting from when she was 15 years old until 12/27/24 until she was admitted and is now wearing a patch. ETOH: beer 1-2 cups daily Illicit drug use: Never.  Patient and friend Yasmine had multiple questions which were answered to satisfaction  Follow-up after the visit with thoracic surgery next  No labs

## 2025-03-25 NOTE — HISTORY OF PRESENT ILLNESS
[de-identified] : Ms. Kassi Cifuentes is 67 y/o female with right upper lung adenocarcinoma here for hospital follow up  Patient with hx of intracranial aneurysm (diag ) s/p clipping at [facility], active smoker who presented at [place] ED 2024 c/o back pain and difficulty ambulating.  2024 CT Lumbar Age indeterminate mild anterior wedge compression fracture deformity of T11. Mild retropulsion of the posterosuperior corner of the vertebral body into the spinal canal. 4.6 cm indeterminate low-attenuation lesion within the spleen. A neoplastic lesion is not excluded. Recommend a dedicated contrast-enhanced abdomen and pelvis CT study for further evaluation.  1/3/2025 CT C/A/P showed 2.4 x 3.7 cm right upper lobe cavitary mass with irregular nodular wall thickening concerning for malignancy. Infection less likely however is not excluded. 1 cm left upper lobe spiculated nodule suspicious for malignancy (3:47). Emphysema.Mild right hilar adenopathy indeterminate for malignancy.4.3 x 3.1 x 3.7 cm septated splenic cyst.Constipated colon. Colonic diverticulosis without diverticulitis. Age-indeterminate T11 compression fracture with 3 to 4 mm osseous retropulsion, possibly acute on chronic.   2025 CT Thoracic 1.   T11 vertebral body compression fracture deformity with 3-4 mm retropulsion and loss of 50% of vertebral body height, indeterminate in age but possibly acute on chronic. 2.   Incompletely visualized irregular consolidation density parenchymal  opacity in the right upper lobe an incompletely visualized nodular  consolidation density in the left upper lobe. Recommend chest CT for further  evaluation.  1/3/2025 bone scan Focal uptake across vertebral body T11, corresponding to the compression deformity seen on recent CT. Of note, fractures on bone scan can demonstrate uptake for many months.  2025 CT head No CT evidence of intracranial metastasis.  2025  VERTREBRAL BODY, T11, BIOPSY: 	-  Bone with reactive changes of the marrow and evidence of bone remodeling 	-  Negative for carcinoma Negative: Pancytokeratin, cytokeratin 7 and TTF1   2025 A. LUNG, RIGHT UPPER LOBE, BIOPSY: Poorly-differentiated carcinoma, most favored adenocarcinoma of lung origin.   Immunoprofile of carcinoma (A1): P63 and P40: Patchy to no staining of carcinoma, respectively TTF1: Patchy strong staining of carcinoma   Patient is a 65-year-old female with a PMHx of hypothyroidism and HLD who presented to [place] in the emergency room due to severe back pain that led to the incidental findings of her lung mass on 25. Patient cannot recall how she was referred to hematology-oncology. Patient is a poor historian.  Menarche: 14 years old  Menopause: Patient stated her menopause at an early age of 37.  G2,P0 Hysterectomy: Never  Oophorectomy: Never  Oral Contraceptives: 18 years old till about 36years old  Hormone Replacement Therapy: Last pap/pelvic exam: 35 years ago  Related family history:  Occupation: Customer service for [employer]   Genetic testing: Never   Diet: Regular everything but seafood  Previous Tx Hx: N/A Health Maintenance: Colonoscopy: 25 years ago  Endoscopy:  26 years ago  Mammo/US: 25 years ago Gyn: 25 years ago  DVTs: N/A Anticoagulant Hx: n/a Sleep Apnea: Yes  Recent Hospitalizations: 25 - 25 to a rehab facility ([facility] and nursing [place])  Vaccinations: Flu & Covid Vaccinated    Past med hx: T11 fracture    Past sug hx: Brain Aneurysm open brain surgery 10/2008  Related family history: Mother:  at 81yo. Patient doesn't remember cause of death  Father:  at 85 yo. Patient passed away from a PE Brother:  at 54 years old from colon cancer.  Brother:  at 68 years old from heart attack Cousin:  from lung cancer    Smokin/2 pack/day starting from when she was 15 years old until 24 until she was admitted and is now wearing a patch. ETOH: beer 1-2 cups daily  Illicit drug use: Never     Reports that her rehab was delayed due to norovirus GI infection x 4 days  Lung, Right Upper Lobe, biopsy Poorly-differentiated carcinoma, mostly favored adenocarcnima of lung origin.   VERTREBRAL BODY, T11, BIOPSY: -  Bone with reactive changes of the marrow and evidence of bone remodeling -  Negative for carcinoma      [de-identified] : Patient is seen today for follow up She is accompanied by her friend Yasmine  She is s/p lung biopsy CT guided with Dr. Costello. She is accompanied by her friend Yasmine Lung Biopsy 3/11/25: LEFT UPPER LOBE LUNG, BIOPSY: - Adenocarcinoma in a background of inflammatory fibrosis and fragments of benign alveolar tissue; see comment -Immunohistochemical stains TTF-1 and CK7 are positive in tumor cells. P40 is negative. -PD-L1 Immunohistochemistry Antibody: [place] PDL1 () Tissue type: Lung Block: A1 Result: Tumor Proportion Score (TPS*) 3% Interpretation: Positive

## 2025-03-25 NOTE — HISTORY OF PRESENT ILLNESS
[de-identified] : Ms. Kassi Cifuentes is 65 y/o female with right upper lung adenocarcinoma here for hospital follow up  Patient with hx of intracranial aneurysm (diag ) s/p clipping at [facility], active smoker who presented at [place] ED 2024 c/o back pain and difficulty ambulating.  2024 CT Lumbar Age indeterminate mild anterior wedge compression fracture deformity of T11. Mild retropulsion of the posterosuperior corner of the vertebral body into the spinal canal. 4.6 cm indeterminate low-attenuation lesion within the spleen. A neoplastic lesion is not excluded. Recommend a dedicated contrast-enhanced abdomen and pelvis CT study for further evaluation.  1/3/2025 CT C/A/P showed 2.4 x 3.7 cm right upper lobe cavitary mass with irregular nodular wall thickening concerning for malignancy. Infection less likely however is not excluded. 1 cm left upper lobe spiculated nodule suspicious for malignancy (3:47). Emphysema.Mild right hilar adenopathy indeterminate for malignancy.4.3 x 3.1 x 3.7 cm septated splenic cyst.Constipated colon. Colonic diverticulosis without diverticulitis. Age-indeterminate T11 compression fracture with 3 to 4 mm osseous retropulsion, possibly acute on chronic.   2025 CT Thoracic 1.   T11 vertebral body compression fracture deformity with 3-4 mm retropulsion and loss of 50% of vertebral body height, indeterminate in age but possibly acute on chronic. 2.   Incompletely visualized irregular consolidation density parenchymal  opacity in the right upper lobe an incompletely visualized nodular  consolidation density in the left upper lobe. Recommend chest CT for further  evaluation.  1/3/2025 bone scan Focal uptake across vertebral body T11, corresponding to the compression deformity seen on recent CT. Of note, fractures on bone scan can demonstrate uptake for many months.  2025 CT head No CT evidence of intracranial metastasis.  2025  VERTREBRAL BODY, T11, BIOPSY: 	-  Bone with reactive changes of the marrow and evidence of bone remodeling 	-  Negative for carcinoma Negative: Pancytokeratin, cytokeratin 7 and TTF1   2025 A. LUNG, RIGHT UPPER LOBE, BIOPSY: Poorly-differentiated carcinoma, most favored adenocarcinoma of lung origin.   Immunoprofile of carcinoma (A1): P63 and P40: Patchy to no staining of carcinoma, respectively TTF1: Patchy strong staining of carcinoma   Patient is a 65-year-old female with a PMHx of hypothyroidism and HLD who presented to [place] in the emergency room due to severe back pain that led to the incidental findings of her lung mass on 25. Patient cannot recall how she was referred to hematology-oncology. Patient is a poor historian.  Menarche: 14 years old  Menopause: Patient stated her menopause at an early age of 37.  G2,P0 Hysterectomy: Never  Oophorectomy: Never  Oral Contraceptives: 18 years old till about 36years old  Hormone Replacement Therapy: Last pap/pelvic exam: 35 years ago  Related family history:  Occupation: Customer service for [employer]   Genetic testing: Never   Diet: Regular everything but seafood  Previous Tx Hx: N/A Health Maintenance: Colonoscopy: 25 years ago  Endoscopy:  26 years ago  Mammo/US: 25 years ago Gyn: 25 years ago  DVTs: N/A Anticoagulant Hx: n/a Sleep Apnea: Yes  Recent Hospitalizations: 25 - 25 to a rehab facility ([facility] and nursing [place])  Vaccinations: Flu & Covid Vaccinated    Past med hx: T11 fracture    Past sug hx: Brain Aneurysm open brain surgery 10/2008  Related family history: Mother:  at 83yo. Patient doesn't remember cause of death  Father:  at 85 yo. Patient passed away from a PE Brother:  at 54 years old from colon cancer.  Brother:  at 68 years old from heart attack Cousin:  from lung cancer    Smokin/2 pack/day starting from when she was 15 years old until 24 until she was admitted and is now wearing a patch. ETOH: beer 1-2 cups daily  Illicit drug use: Never     Reports that her rehab was delayed due to norovirus GI infection x 4 days  Lung, Right Upper Lobe, biopsy Poorly-differentiated carcinoma, mostly favored adenocarcnima of lung origin.   VERTREBRAL BODY, T11, BIOPSY: -  Bone with reactive changes of the marrow and evidence of bone remodeling -  Negative for carcinoma      [de-identified] : Patient is seen today for follow up She is accompanied by her friend Yasmine  She is s/p lung biopsy CT guided with Dr. Costello. She is accompanied by her friend Yasmine Lung Biopsy 3/11/25: LEFT UPPER LOBE LUNG, BIOPSY: - Adenocarcinoma in a background of inflammatory fibrosis and fragments of benign alveolar tissue; see comment -Immunohistochemical stains TTF-1 and CK7 are positive in tumor cells. P40 is negative. -PD-L1 Immunohistochemistry Antibody: [place] PDL1 () Tissue type: Lung Block: A1 Result: Tumor Proportion Score (TPS*) 3% Interpretation: Positive

## 2025-03-25 NOTE — PHYSICAL EXAM
[Capable of only limited self care, confined to bed or chair more than 50% of waking hours] : Status 3- Capable of only limited self care, confined to bed or chair more than 50% of waking hours [Normal] : grossly intact [Ambulatory and capable of all self care but unable to carry out any work activities] : Status 2- Ambulatory and capable of all self care but unable to carry out any work activities. Up and about more than 50% of waking hours [de-identified] : Walks with cane [de-identified] : Wearing spine support

## 2025-04-01 NOTE — DATA REVIEWED
[FreeTextEntry1] : 2025 CT Thoracic Spine Patient Name:	   FATOUMATA SHEPHERD		Location:	[address] Rec #: 	  [id number]		Account #: 	[id number]	 YOB: 1959		Ordering:	Mraivel Boyce NP	 Age: 65	      Sex:    F		Attending:	Yash Nguyen DO	 PCP:	     Mike Mccarthy MD ____________________________________________________________________________________				 Exam Date: 	  25					 Exam: 	CT THORACIC SPINE					 Order#:	[id number]					     PROCEDURE INFORMATION:  Exam: CT Thoracic Spine Without Contrast  Exam date and time: 2025 6:16 PM  Age: 65 years old  Clinical indication: T FX   TECHNIQUE:  Imaging protocol: Computed tomography of the thoracic spine without contrast.   COMPARISON:  CR XR THORACIC LUMBAR SPINE JUNCTION 2 VIEWS 2024 11:33 AM   FINDINGS:  Bones/joints: T11 vertebral body compression fracture  deformity with 3-4 mm retropulsion and loss of 50% of vertebral body height, indeterminate in age but possibly acute on chronic. Soft tissues: Unremarkable.  Lungs: Incompletely visualized irregular consolidation density parenchymal  opacity in the right upper lobe an incompletely visualized nodular  consolidation density in the left upper lobe. Recommend chest CT for further  evaluation.   IMPRESSION:  1.   T11 vertebral body compression fracture  deformity with 3-4 mm retropulsion and loss of 50% of vertebral body height, indeterminate in age but possibly acute on chronic. 2.   Incompletely visualized irregular consolidation density parenchymal  opacity in the right upper lobe an incompletely visualized nodular  consolidation density in the left upper lobe. Recommend chest CT for further  evaluation. --- End of Report --- ============================================================================= CT Chest/abdomen/pelvis with IV contrast Patient Name:	   FATOUMATA SHEPHERD		Location:	[address] Rec #: 	  [id number]		Account #: 	[id number]	 YOB: 1959		Ordering:	Marivel Boyce NP	 Age: 65	      Sex:    F		Attending:	Yash Nguyen DO	 PCP:	     Mike Mccarthy MD ___________________________________________________________________________________						 Exam Date: 	  25					 Exam: 	CT CHEST/ABD/PELVIS IC					 Order#:	[id number]					  CLINICAL INFORMATION: 65 years  Female with splenic cyst.  COMPARISON: CT lumbar spine 2024 and CT thoracic spine 2025 CONTRAST/COMPLICATIONS: IV Contrast: Omnipaque 350  90 cc administered   10 cc discarded Oral Contrast: NONE .PROCEDURE:  CT of the Chest, Abdomen and Pelvis was performed. Sagittal and coronal reformats were performed.  FINDINGS: CHEST:  LUNGS AND LARGE AIRWAYS: Patent central airways. Moderate centrilobular emphysema. 2.4 x 3.7 cm right upper lobe cavitary mass with irregular nodular wall thickening concerning for malignancy. Infection is less likely however is not excluded. 1 cm left upper lobe spiculated nodule suspicious for malignancy (3:47). 2.5 x 1.5 cm right lower lobe bulla. 1.3 cm right lower lobe bulla versus lung cysts. Bibasilar dependent atelectasis. PLEURA: No pleural effusion. VESSELS: Aortic and coronary atherosclerosis. HEART: Heart size is normal. No pericardial effusion. MEDIASTINUM AND JOSE E: 1.2 x 1.0 cm right hilar lymph node. No left hilar or mediastinal adenopathy. Superior mediastinal lymph node measuring 6 mm short axis. CHEST WALL AND LOWER NECK: Within normal limits. ABDOMEN AND PELVIS: LIVER: Within normal limits. BILE DUCTS: Normal caliber. GALLBLADDER: Mildly distended gallbladder. SPLEEN: 4.3 x 3.1 x 3.7 cm septated splenic cyst. PANCREAS: Within normal limits. ADRENALS: Within normal limits. KIDNEYS/URETERS: Trace bilateral hydronephrosis. No hydroureter or ureteral calculi. Renal vascular calcifications limit evaluation for small nonobstructing intrarenal calculi.  BLADDER: Within normal limits. REPRODUCTIVE ORGANS: Unremarkable uterus. BOWEL: No bowel obstruction. Appendix is normal. Small sliding hiatus hernia. 1 cm duodenal diverticula. Moderate colonic stool burden. Sigmoid diverticulosis without diverticulitis. PERITONEUM/RETROPERITONEUM: Within normal limits. VESSELS: Atherosclerotic changes. LYMPH NODES: No lymphadenopathy.   ABDOMINAL WALL: Within normal limits. BONES: Osteopenia. Moderate to severe T11 compression fracture with 3 to 4 mm osseous retropulsion, age indeterminate but possibly acute on chronic. IMPRESSION:   2.4 x 3.7 cm right upper lobe cavitary mass with irregular nodular wall thickening concerning for malignancy. Infection less likely however is not excluded.   1 cm left upper lobe spiculated nodule suspicious for malignancy (3:47).   Emphysema.  Mild right hilar adenopathy indeterminate for malignancy.  4.3 x 3.1 x 3.7 cm septated splenic cyst.  Constipated colon. Colonic diverticulosis without diverticulitis.  Age-indeterminate T11 compression fracture with 3 to 4 mm osseous retropulsion, possibly acute on chronic.    --- End of Report --- ========================================================================= VERTREBRAL BODY, T11, BIOPSY: 2025 Patient Name:	FATOUMATA SHEPHERD	Accession #:	[id number] [facility]. Rec. #:	[id number]	Location:	[facility]	Collected:	25 :	1959	Room/Bed:	[facility] [place]	Accessioned:	25 Age & Gender:	65 Year(s) F	Account #:	[id number]	Reported:	25 Admitting MD:	Marivel Boyce NP	Submitting MD:	Ashkan Coronado MD Copy To:	Mike Mccarthy MD	;Le,Phi DO	; FINAL DIAGNOSIS  VERTREBRAL BODY, T11, BIOPSY: 	-  Bone with reactive changes of the marrow and evidence of bone remodeling 	-  Negative for carcinoma  Immunohistochemical stain results support the diagnosis Negative: Pancytokeratin, cytokeratin 7 and TTF1   MICROSCOPIC DESCRIPTION    Microscopic performed  CLINICAL HISTORY    Pre-op Diagnosis: Pathological fracture neoplastic disease, vertebral T11 Post-op Diagnosis: None provided  SPECIMEN(S) RECEIVED   A. Specimen - VERTEBRAL BODY T11  GROSS DESCRIPTION    Received: In formalin labeled "vertebral body T11" Size: One core measuring 0.7 x 0.2 cm Description: Tan-pink, bony Submitted: Entirely following EDTA decalcification in one cassette: A1 MARTHA Doll (ASCP) 2025 11:09:46 AM  In addition to other data that may appear on the specimen containers, all labels have been inspected to confirm the presence of the patient's name and date of birth. ============================================================================= Right upper lobe lung biopsy on 2025 Patient Name:	FATOUMATA SHEPHERD	Accession #:	[id number] Med. Rec. #:	[id number]	Location:	[facility]	Collected:	25 :	1959	Room/Bed:	[facility] [place]	Accessioned:	25 Age & Gender:	65 Year(s) F	Account #:	[id number]	Reported:	25 Admitting MD:	Marivel Boyce NP	Submitting MD:	Jitendra Costello MD Copy To:	Mike Mccarthy MD	;Yash Nguyen DO	;Ashkan Coronado MD ADDENDUM DIAGNOSIS  1  Tumor Proportion Score (TPS)/ percentage of viable tumor cells showing partial or complete membrane staining at any intensity.  TPS (A2): High, 70%  For slides without built in controls positive control slides have been reviewed and approved by immunohistochemistry lab. This immunohistochemical test have been developed and their performance characteristics determined by [facility], Department of Pathology, Division of Immunopathology, [address] [place] [address].  This test is used for clinical purposes. It has not been cleared or approved by the U.S. Food and Drug Administration. The FDA has determined that such clearance or approval is not necessary. The laboratory is certified under the CLIA-88 as qualified to perform high complexity clinical testing.  	 Addendum Sign Out Date: 25	*** Electronically Signed Out *** Original Sign Out Date: 25	Gayathri Torres FINAL DIAGNOSIS  A. LUNG, RIGHT UPPER LOBE, BIOPSY: Poorly-differentiated carcinoma, most favored adenocarcinoma of lung origin.   DIAGNOSIS COMMENT    Immunoprofile of carcinoma (A1): P63 and P40: Patchy to no staining of carcinoma, respectively TTF1: Patchy strong staining of carcinoma  CLINICAL HIStORY    Pre-op Diagnosis: Right upper lobe lung mass Post-op Diagnosis: None provided   SPECIMEN(S) RECEIVED   A. FNA CORE - RUL LUNG BIOPSY GROSS DESCRIPTION    Received: In formalin labeled "right upper lobe lung biopsy" Size: Two cores 0.2 x 0.1 cm and 1.3 x 0.1 cm Description: Tan-white, soft  Touch Prep Diagnosis: A. Right upper lobe lung biopsy, touch prep: - Pass 1: Malignant cells seen, per cytologist JUAN PABLO Myers (ASCP)      By Dr. Torres 3:18 PM  2025   Frozen Section performed at [facility], Department of Pathology, [address] [place] [address].  The frozen section and frozen section control has been reviewed by the final pathologist who verified this report.  Submitted: Entirely in two cassettes: A1-A2 =========================================================================== Patient Name:      FATOUMATA SHEPHERD                   Location:       [facility] Rec #:        [id number]                    Account #:      [id number] YOB: 1959                    Ordering:       Yohan Lozano MD Age: 65               Sex:    F                 Attending:      Yohan Lozano MD PCP:        Mike Mccarthy MD ______________________________________________________________________________________  Exam Date:      25 Exam:         PET SKUL-THI ONC FDG INIT Order#:       PET [id number]  CLINICAL INFORMATION: Malignant neoplasm of the upper lobe right lung.   TREATMENT STRATEGY EVALUATION: Initial  AREA IMAGED: Skull base-to-thigh  FASTING BLOOD SUGAR: 104 mg/dl  RADIOPHARMACEUTICAL: 8.8 mCi F-18 FDG, I.V.  I.V. SITE: forearm right  UPTAKE PERIOD: 66 min  SCANNER: Siemens Biograph mCT  ORAL CONTRAST: None  PHARMACOLOGIC INTERVENTION: None.   TECHNIQUE: Following intravenous injection of above radiopharmaceutical and uptake period, PET/CT was performed. CT protocol was optimized for PET attenuation correction and anatomic localization and was not designed to produce and cannot replace state-of-the-art diagnostic CT images with specific imaging protocols for different body parts and indications. Images were reconstructed and reviewed in axial, coronal and sagittal views and three-dimensional MIP.   The standardized uptake values (SUV) are normalized to patient body weight and indicate the highest activity concentration (SUVmax) in a given site. All image numbers refer to axial image number.   COMPARISON:  None.   OTHER STUDIES USED FOR CORRELATION: CT chest abdomen pelvis 1/3/2025   FINDINGS:   HEAD/NECK: Physiologic FDG activity in visualized brain, head, and neck. Status post right pterional craniotomy with aneurysm clipping.   THORAX: No abnormal FDG activity. No lymphadenopathy. Physiologic uptake within the myocardium.   LUNGS:  RIGHT upper lobe hypermetabolic 2.6 x 4.0 cm cavitary mass with nodular wall thickening and likely extension to the pleura, SUV max 11.4 (4:67).  Posterior RIGHT lower lobe subpleural 1.2 x 1.2 cm nodule in the medial wall of a bulla/cyst with FDG uptake, SUV max 2.5 (4:79).  Lateral LEFT upper lobe hypermetabolic 1.1 x 0.9 cm spiculated nodule, SUV max 3.2 (4:62).  Medial LEFT lower lobe 0.7 x 1.0 cm nodule with FDG uptake, SUV max 1.6 (4:87).   PLEURA/PERICARDIUM: No abnormal FDG activity. No effusion.   HEPATOBILIARY/PANCREAS: Physiologic FDG activity.  For reference, normal liver demonstrates SUV mean 2.0.   SPLEEN: Physiologic FDG activity. Normal in size. Photopenic 4.4 cm splenic cyst.   ADRENAL GLANDS: No abnormal FDG activity. No nodule.   KIDNEYS/URINARY BLADDER: Physiologic excreted FDG activity.   REPRODUCTIVE ORGANS: No abnormal FDG activity.   ABDOMINOPELVIC LYMPH NODES/RETROPERITONEUM: No enlarged or FDG-avid lymph node.   ESOPHAGUS/STOMACH/BOWEL/PERITONEUM/MESENTERY: No abnormal FDG activity.   VESSELS: Atherosclerotic changes in the aorta and its branches. No aneurysm.   BONES/SOFT TISSUES: No FDG avid osseous lesion. Status post kyphoplasty T11 with mild low level FDG uptake likely reactive. Few foci of physiologic muscle uptake in the upper back and paraspinous muscles.   IMPRESSION:  1. Hypermetabolic 4 cm cavitary lung mass in the right upper lobe, corresponding to known malignancy. No hypermetabolic lymphadenopathy.  2. Hypermetabolic 1.0 cm spiculated lung nodule in the lateral left upper lobe, concerning for malignancy.  3. FDG uptake in a 1.2 cm right lower lobe nodule and a 0.8 cm left lower lobe nodule, both of which may represent infectious/inflammatory etiology, however malignancy cannot be excluded.  4. No additional sites of pathologic FDG uptake to suggest biologic tumor activity.  --- End of Report --- ============================================================================== 3/11/2025 left upper lobe lung biopsy Patient Name:	FATOUMATA SHEPHERD	Accession #:	[id number] Med. Rec. #:	[id number]	Location:	[facility]	Collected:	25 :	1959	Room/Bed:		Accessioned:	25 Age & Gender:	65 Year(s) F	Account #:	[id number]	Reported:	25 Admitting MD:	Yohan Lozano MD	Submitting MD:	Jitendra Costello MD Copy To:	Mike Mccarthy MD	;Yohan Lozano MD	; ADDENDUM DIAGNOSIS  1   PD-L1 Immunohistochemistry Antibody: [place] PDL1 () Tissue type: Lung  Block: A1  Result: Tumor Proportion Score (TPS*) 3% Interpretation: Positive NOTE:  There has been intradepartmental review of the case with agreement.  *TPS: The percentage of viable tumor cells showing partial or complete membrane staining at any intensity Slide(s) with built in immunohistochemical study control(s) and negative control associated with this case has/have been verified by the sign out pathologist.  For slide(s) without built in controls positive control slides has/have been reviewed and approved by immunohistochemistry lab These immunohistochemical/ in-situ hybridization tests have been developed and their performance characteristics determined by [facility], Department of Pathology, Division of Immunopathology, [address] [place] [address].  It has not been cleared or approved by the U.S. Food and Drug Administration.  The FDA has determined that such clearance or approval is not necessary.  This test is used for clinical purposes.  The laboratory is certified under the CLIA-88 as qualified to perform high complexity clinical testing.  	 Addendum Sign Out Date: 25	*** Electronically Signed Out *** Original Sign Out Date: 25	Gertrudis Kingston  FINAL DIAGNOSIS   LEFT UPPER LOBE LUNG, BIOPSY: - Adenocarcinoma in a background of inflammatory fibrosis and fragments of benign alveolar tissue; see comment  DIAGNOSIS COMMENT     Immunohistochemical stains TTF-1 and CK7 are positive in tumor cells. P40 is negative.  There has been intradepartmental review of the case with agreement.  Biopsy result has been sent to Dr. Lozano via e-mail on 3/13/25.   CLINICAL HISTORY    Pre-op Diagnosis: Right upper lung adenocarcinoma Post-op Diagnosis: None provided  SPECIMEN(S) RECEIVED   A. FNA CORE - LEFT UPPER LOBE LUNG BIOPSY GROSS DESCRIPTION    Received: In formalin labeled "left upper lobe lung biopsy" Size: Multiple cores ranging from 0.3 x 0.1 cm to 0.5 x 0.1 cm Description: Tan, soft Frozen Section Diagnosis: A. Left upper lobe lung biopsy, touch prep: - Pass 1: Lesional tissue/cells present      By Dr. Kingston 12:43 PM 2025   Frozen Section performed at [facility], Department of Pathology, [address] [place] [address].  The frozen section and frozen section control has been reviewed by the final pathologist who verified this report.   Submitted: Entirely in two cassettes: A1-A2  In addition to other data that may appear on the specimen containers, all labels have been inspected to confirm the presence of the patient's name and date of birth.  Histological Processing Performed at [facility], Department of Pathology, [address] [place] [address].

## 2025-04-01 NOTE — PHYSICAL EXAM
[General Appearance - Alert] : alert [General Appearance - In No Acute Distress] : in no acute distress [General Appearance - Well Nourished] : well nourished [General Appearance - Well Developed] : well developed [Sclera] : the sclera and conjunctiva were normal [Outer Ear] : the ears and nose were normal in appearance [Neck Appearance] : the appearance of the neck was normal [] : no respiratory distress [Respiration, Rhythm And Depth] : normal respiratory rhythm and effort [Examination Of The Chest] : the chest was normal in appearance [Chest Visual Inspection Thoracic Asymmetry] : no chest asymmetry [Abdomen Soft] : soft [Abdomen Tenderness] : non-tender [Oriented To Time, Place, And Person] : oriented to person, place, and time [Impaired Insight] : insight and judgment were intact [Affect] : the affect was normal [Mood] : the mood was normal [Auscultation Breath Sounds / Voice Sounds] : lungs were clear to auscultation bilaterally [Heart Rate And Rhythm] : heart rate was normal and rhythm regular [Heart Sounds] : normal S1 and S2 [FreeTextEntry1] : No lower extremity edema

## 2025-04-01 NOTE — DATA REVIEWED
[FreeTextEntry1] : 2025 CT Thoracic Spine Patient Name:	   FATOUMATA SHEPHERD		Location:	[address] Rec #: 	  [id number]		Account #: 	[id number]	 YOB: 1959		Ordering:	Marivel Boyce NP	 Age: 65	      Sex:    F		Attending:	Yash Nguyen DO	 PCP:	     Mike Mccarthy MD ____________________________________________________________________________________				 Exam Date: 	  25					 Exam: 	CT THORACIC SPINE					 Order#:	[id number]					     PROCEDURE INFORMATION:  Exam: CT Thoracic Spine Without Contrast  Exam date and time: 2025 6:16 PM  Age: 65 years old  Clinical indication: T FX   TECHNIQUE:  Imaging protocol: Computed tomography of the thoracic spine without contrast.   COMPARISON:  CR XR THORACIC LUMBAR SPINE JUNCTION 2 VIEWS 2024 11:33 AM   FINDINGS:  Bones/joints: T11 vertebral body compression fracture  deformity with 3-4 mm retropulsion and loss of 50% of vertebral body height, indeterminate in age but possibly acute on chronic. Soft tissues: Unremarkable.  Lungs: Incompletely visualized irregular consolidation density parenchymal  opacity in the right upper lobe an incompletely visualized nodular  consolidation density in the left upper lobe. Recommend chest CT for further  evaluation.   IMPRESSION:  1.   T11 vertebral body compression fracture  deformity with 3-4 mm retropulsion and loss of 50% of vertebral body height, indeterminate in age but possibly acute on chronic. 2.   Incompletely visualized irregular consolidation density parenchymal  opacity in the right upper lobe an incompletely visualized nodular  consolidation density in the left upper lobe. Recommend chest CT for further  evaluation. --- End of Report --- ============================================================================= CT Chest/abdomen/pelvis with IV contrast Patient Name:	   FATOUMATA SHEPHERD		Location:	[address] Rec #: 	  [id number]		Account #: 	[id number]	 YOB: 1959		Ordering:	Marivel Boyce NP	 Age: 65	      Sex:    F		Attending:	Yash Nguyen DO	 PCP:	     Mike Mccarthy MD ___________________________________________________________________________________						 Exam Date: 	  25					 Exam: 	CT CHEST/ABD/PELVIS IC					 Order#:	[id number]					  CLINICAL INFORMATION: 65 years  Female with splenic cyst.  COMPARISON: CT lumbar spine 2024 and CT thoracic spine 2025 CONTRAST/COMPLICATIONS: IV Contrast: Omnipaque 350  90 cc administered   10 cc discarded Oral Contrast: NONE .PROCEDURE:  CT of the Chest, Abdomen and Pelvis was performed. Sagittal and coronal reformats were performed.  FINDINGS: CHEST:  LUNGS AND LARGE AIRWAYS: Patent central airways. Moderate centrilobular emphysema. 2.4 x 3.7 cm right upper lobe cavitary mass with irregular nodular wall thickening concerning for malignancy. Infection is less likely however is not excluded. 1 cm left upper lobe spiculated nodule suspicious for malignancy (3:47). 2.5 x 1.5 cm right lower lobe bulla. 1.3 cm right lower lobe bulla versus lung cysts. Bibasilar dependent atelectasis. PLEURA: No pleural effusion. VESSELS: Aortic and coronary atherosclerosis. HEART: Heart size is normal. No pericardial effusion. MEDIASTINUM AND JOSE E: 1.2 x 1.0 cm right hilar lymph node. No left hilar or mediastinal adenopathy. Superior mediastinal lymph node measuring 6 mm short axis. CHEST WALL AND LOWER NECK: Within normal limits. ABDOMEN AND PELVIS: LIVER: Within normal limits. BILE DUCTS: Normal caliber. GALLBLADDER: Mildly distended gallbladder. SPLEEN: 4.3 x 3.1 x 3.7 cm septated splenic cyst. PANCREAS: Within normal limits. ADRENALS: Within normal limits. KIDNEYS/URETERS: Trace bilateral hydronephrosis. No hydroureter or ureteral calculi. Renal vascular calcifications limit evaluation for small nonobstructing intrarenal calculi.  BLADDER: Within normal limits. REPRODUCTIVE ORGANS: Unremarkable uterus. BOWEL: No bowel obstruction. Appendix is normal. Small sliding hiatus hernia. 1 cm duodenal diverticula. Moderate colonic stool burden. Sigmoid diverticulosis without diverticulitis. PERITONEUM/RETROPERITONEUM: Within normal limits. VESSELS: Atherosclerotic changes. LYMPH NODES: No lymphadenopathy.   ABDOMINAL WALL: Within normal limits. BONES: Osteopenia. Moderate to severe T11 compression fracture with 3 to 4 mm osseous retropulsion, age indeterminate but possibly acute on chronic. IMPRESSION:   2.4 x 3.7 cm right upper lobe cavitary mass with irregular nodular wall thickening concerning for malignancy. Infection less likely however is not excluded.   1 cm left upper lobe spiculated nodule suspicious for malignancy (3:47).   Emphysema.  Mild right hilar adenopathy indeterminate for malignancy.  4.3 x 3.1 x 3.7 cm septated splenic cyst.  Constipated colon. Colonic diverticulosis without diverticulitis.  Age-indeterminate T11 compression fracture with 3 to 4 mm osseous retropulsion, possibly acute on chronic.    --- End of Report --- ========================================================================= VERTREBRAL BODY, T11, BIOPSY: 2025 Patient Name:	FATOUMATA SHEPHERD	Accession #:	[id number] [facility]. Rec. #:	[id number]	Location:	[facility]	Collected:	25 :	1959	Room/Bed:	[facility]	Accessioned:	25 Age & Gender:	65 Year(s) F	Account #:	[id number]	Reported:	25 Admitting MD:	Marivel Boyce NP	Submitting MD:	Ashkan Coronado MD Copy To:	Mike Mccarthy MD	;Le,Phi DO	; FINAL DIAGNOSIS  VERTREBRAL BODY, T11, BIOPSY: 	-  Bone with reactive changes of the marrow and evidence of bone remodeling 	-  Negative for carcinoma  Immunohistochemical stain results support the diagnosis Negative: Pancytokeratin, cytokeratin 7 and TTF1   MICROSCOPIC DESCRIPTION    Microscopic performed  CLINICAL HISTORY    Pre-op Diagnosis: Pathological fracture neoplastic disease, vertebral T11 Post-op Diagnosis: None provided  SPECIMEN(S) RECEIVED   A. Specimen - VERTEBRAL BODY T11  GROSS DESCRIPTION    Received: In formalin labeled "vertebral body T11" Size: One core measuring 0.7 x 0.2 cm Description: Tan-pink, bony Submitted: Entirely following EDTA decalcification in one cassette: A1 MARTHA Doll (ASCP) 2025 11:09:46 AM  In addition to other data that may appear on the specimen containers, all labels have been inspected to confirm the presence of the patient's name and date of birth. ============================================================================= Right upper lobe lung biopsy on 2025 Patient Name:	FATOUMATA SHEPHERD	Accession #:	[id number] Med. Rec. #:	[id number]	Location:	[facility]	Collected:	25 :	1959	Room/Bed:	[facility]	Accessioned:	25 Age & Gender:	65 Year(s) F	Account #:	[id number]	Reported:	25 Admitting MD:	Marivel Boyce NP	Submitting MD:	Jitendra Costello MD Copy To:	Mike Mccarthy MD	;Yash Nguyen DO	;Ashkan Coronado MD ADDENDUM DIAGNOSIS  1  Tumor Proportion Score (TPS)/ percentage of viable tumor cells showing partial or complete membrane staining at any intensity.  TPS (A2): High, 70%  For slides without built in controls positive control slides have been reviewed and approved by immunohistochemistry lab. This immunohistochemical test have been developed and their performance characteristics determined by [facility], Department of Pathology, Division of Immunopathology, [address] [place] [address].  This test is used for clinical purposes. It has not been cleared or approved by the U.S. Food and Drug Administration. The FDA has determined that such clearance or approval is not necessary. The laboratory is certified under the CLIA-88 as qualified to perform high complexity clinical testing.  	 Addendum Sign Out Date: 25	*** Electronically Signed Out *** Original Sign Out Date: 25	Gayathri Torres FINAL DIAGNOSIS  A. LUNG, RIGHT UPPER LOBE, BIOPSY: Poorly-differentiated carcinoma, most favored adenocarcinoma of lung origin.   DIAGNOSIS COMMENT    Immunoprofile of carcinoma (A1): P63 and P40: Patchy to no staining of carcinoma, respectively TTF1: Patchy strong staining of carcinoma  CLINICAL HIStORY    Pre-op Diagnosis: Right upper lobe lung mass Post-op Diagnosis: None provided   SPECIMEN(S) RECEIVED   A. FNA CORE - RUL LUNG BIOPSY GROSS DESCRIPTION    Received: In formalin labeled "right upper lobe lung biopsy" Size: Two cores 0.2 x 0.1 cm and 1.3 x 0.1 cm Description: Tan-white, soft  Touch Prep Diagnosis: A. Right upper lobe lung biopsy, touch prep: - Pass 1: Malignant cells seen, per cytologist JUAN PABLO Myers (ASCP)      By Dr. Torres 3:18 PM  2025   Frozen Section performed at [facility], Department of Pathology, [address] [place] [address].  The frozen section and frozen section control has been reviewed by the final pathologist who verified this report.  Submitted: Entirely in two cassettes: A1-A2 =========================================================================== Patient Name:      FATOUMATA SHEPHERD                   Location:       [facility] Rec #:        [id number]                    Account #:      [id number] YOB: 1959                    Ordering:       Yohan Lozano MD Age: 65               Sex:    F                 Attending:      Yohan Lozano MD PCP:        Mike Mccarthy MD ______________________________________________________________________________________  Exam Date:      25 Exam:         PET SKUL-THI ONC FDG INIT Order#:       PET [id number]  CLINICAL INFORMATION: Malignant neoplasm of the upper lobe right lung.   TREATMENT STRATEGY EVALUATION: Initial  AREA IMAGED: Skull base-to-thigh  FASTING BLOOD SUGAR: 104 mg/dl  RADIOPHARMACEUTICAL: 8.8 mCi F-18 FDG, I.V.  I.V. SITE: forearm right  UPTAKE PERIOD: 66 min  SCANNER: Siemens Biograph mCT  ORAL CONTRAST: None  PHARMACOLOGIC INTERVENTION: None.   TECHNIQUE: Following intravenous injection of above radiopharmaceutical and uptake period, PET/CT was performed. CT protocol was optimized for PET attenuation correction and anatomic localization and was not designed to produce and cannot replace state-of-the-art diagnostic CT images with specific imaging protocols for different body parts and indications. Images were reconstructed and reviewed in axial, coronal and sagittal views and three-dimensional MIP.   The standardized uptake values (SUV) are normalized to patient body weight and indicate the highest activity concentration (SUVmax) in a given site. All image numbers refer to axial image number.   COMPARISON:  None.   OTHER STUDIES USED FOR CORRELATION: CT chest abdomen pelvis 1/3/2025   FINDINGS:   HEAD/NECK: Physiologic FDG activity in visualized brain, head, and neck. Status post right pterional craniotomy with aneurysm clipping.   THORAX: No abnormal FDG activity. No lymphadenopathy. Physiologic uptake within the myocardium.   LUNGS:  RIGHT upper lobe hypermetabolic 2.6 x 4.0 cm cavitary mass with nodular wall thickening and likely extension to the pleura, SUV max 11.4 (4:67).  Posterior RIGHT lower lobe subpleural 1.2 x 1.2 cm nodule in the medial wall of a bulla/cyst with FDG uptake, SUV max 2.5 (4:79).  Lateral LEFT upper lobe hypermetabolic 1.1 x 0.9 cm spiculated nodule, SUV max 3.2 (4:62).  Medial LEFT lower lobe 0.7 x 1.0 cm nodule with FDG uptake, SUV max 1.6 (4:87).   PLEURA/PERICARDIUM: No abnormal FDG activity. No effusion.   HEPATOBILIARY/PANCREAS: Physiologic FDG activity.  For reference, normal liver demonstrates SUV mean 2.0.   SPLEEN: Physiologic FDG activity. Normal in size. Photopenic 4.4 cm splenic cyst.   ADRENAL GLANDS: No abnormal FDG activity. No nodule.   KIDNEYS/URINARY BLADDER: Physiologic excreted FDG activity.   REPRODUCTIVE ORGANS: No abnormal FDG activity.   ABDOMINOPELVIC LYMPH NODES/RETROPERITONEUM: No enlarged or FDG-avid lymph node.   ESOPHAGUS/STOMACH/BOWEL/PERITONEUM/MESENTERY: No abnormal FDG activity.   VESSELS: Atherosclerotic changes in the aorta and its branches. No aneurysm.   BONES/SOFT TISSUES: No FDG avid osseous lesion. Status post kyphoplasty T11 with mild low level FDG uptake likely reactive. Few foci of physiologic muscle uptake in the upper back and paraspinous muscles.   IMPRESSION:  1. Hypermetabolic 4 cm cavitary lung mass in the right upper lobe, corresponding to known malignancy. No hypermetabolic lymphadenopathy.  2. Hypermetabolic 1.0 cm spiculated lung nodule in the lateral left upper lobe, concerning for malignancy.  3. FDG uptake in a 1.2 cm right lower lobe nodule and a 0.8 cm left lower lobe nodule, both of which may represent infectious/inflammatory etiology, however malignancy cannot be excluded.  4. No additional sites of pathologic FDG uptake to suggest biologic tumor activity.  --- End of Report --- ============================================================================== 3/11/2025 left upper lobe lung biopsy Patient Name:	FATOUMATA SHEPHERD	Accession #:	[id number] Med. Rec. #:	[id number]	Location:	[facility]	Collected:	25 :	1959	Room/Bed:		Accessioned:	25 Age & Gender:	65 Year(s) F	Account #:	[id number]	Reported:	25 Admitting MD:	Yohan Lozano MD	Submitting MD:	Jitendra Costello MD Copy To:	Mike Mccarthy MD	;Yohan Lozano MD	; ADDENDUM DIAGNOSIS  1   PD-L1 Immunohistochemistry Antibody: [place] PDL1 () Tissue type: Lung  Block: A1  Result: Tumor Proportion Score (TPS*) 3% Interpretation: Positive NOTE:  There has been intradepartmental review of the case with agreement.  *TPS: The percentage of viable tumor cells showing partial or complete membrane staining at any intensity Slide(s) with built in immunohistochemical study control(s) and negative control associated with this case has/have been verified by the sign out pathologist.  For slide(s) without built in controls positive control slides has/have been reviewed and approved by immunohistochemistry lab These immunohistochemical/ in-situ hybridization tests have been developed and their performance characteristics determined by [facility], Department of Pathology, Division of Immunopathology, [address] [place] [address].  It has not been cleared or approved by the U.S. Food and Drug Administration.  The FDA has determined that such clearance or approval is not necessary.  This test is used for clinical purposes.  The laboratory is certified under the CLIA-88 as qualified to perform high complexity clinical testing.  	 Addendum Sign Out Date: 25	*** Electronically Signed Out *** Original Sign Out Date: 25	Gertrudis Kingston  FINAL DIAGNOSIS   LEFT UPPER LOBE LUNG, BIOPSY: - Adenocarcinoma in a background of inflammatory fibrosis and fragments of benign alveolar tissue; see comment  DIAGNOSIS COMMENT     Immunohistochemical stains TTF-1 and CK7 are positive in tumor cells. P40 is negative.  There has been intradepartmental review of the case with agreement.  Biopsy result has been sent to Dr. Lozano via e-mail on 3/13/25.   CLINICAL HISTORY    Pre-op Diagnosis: Right upper lung adenocarcinoma Post-op Diagnosis: None provided  SPECIMEN(S) RECEIVED   A. FNA CORE - LEFT UPPER LOBE LUNG BIOPSY GROSS DESCRIPTION    Received: In formalin labeled "left upper lobe lung biopsy" Size: Multiple cores ranging from 0.3 x 0.1 cm to 0.5 x 0.1 cm Description: Tan, soft Frozen Section Diagnosis: A. Left upper lobe lung biopsy, touch prep: - Pass 1: Lesional tissue/cells present      By Dr. Kingston 12:43 PM 2025   Frozen Section performed at [facility], Department of Pathology, [address] [place] [address].  The frozen section and frozen section control has been reviewed by the final pathologist who verified this report.   Submitted: Entirely in two cassettes: A1-A2  In addition to other data that may appear on the specimen containers, all labels have been inspected to confirm the presence of the patient's name and date of birth.  Histological Processing Performed at [facility], Department of Pathology, [address] [place] [address].

## 2025-04-01 NOTE — ASSESSMENT
[FreeTextEntry1] : Kassi Cifuentes is a very pleasant 65 year old woman, current 50 pack year smoker, with a history of hypothyroidism, HLD, brain aneurysm status post clipping 2008, now found during treatment of a T11 compression fracture to have biopsy proven bilateral upper lobe lung cancers and FDG avid lesions in the lower lobes.  I discussed her case with Dr. Lozano and based on the pathology, the upper lobe lung cancers are separate primaries.  I explained to the patient that this is a better prognosis than having metastatic disease and that we can treat these cancers as earlier stage. I therefore recommended surgical resection of the right upper lobe cancer. I also explained that it would be prudent to resect the lower lobe lesion at the same time given the fairly high pretest probabillity that it also is a malignancy. I therefore recommended doing a robotic right upper lobectomy, possible wedge, right lower lobe wedge, and mediastinal lymph node dissection. I briefly reviewed the risks, benefits, and alternatives as well as the conduct of the operation and expected postoperative outcome. She is hesitant to proceed.  For now, we will start with getting PFTs and cardiac clearance. I will plan to see her back after these are done and hopefully, she will at that time be in agreement to proceed to surgery.  She was accompanied by her friend and asked appropriate and intelligent questions.  Thank you for involving us in the care of this patient. Please feel free to reach out with any questions or concerns.  Robert Hogan MD Thoracic Surgery

## 2025-04-01 NOTE — HISTORY OF PRESENT ILLNESS
[FreeTextEntry1] : Ms. Kassi Cifuentes is a very pleasant 65-year-old female 50 pack year smoker with a history of hypothyroidism, HLD brain aneurysm s/p clipping 2008,  who was found during workup for back pain to have bilateral lung masses, now biopsy proven separate primary adenocarcinomas and referred by Dr. Lozano for management.  She presented to [facility] in the emergency room on 12/27/2024, due to severe back pain.  Workup during that time revealed bilateral lung nodules (right upper lobe 3.7cm right upper lobe cavitary mass, 1cm left upper lobe spiculated nodule, 1.2cm right lower lobe nodule, and a 0.8cm left lower lobe nodule) as well as a T11 compression fracture. She had a kyphoplasty and bone biopsy with Dr. Bernard on 1/6/25. IR lung biopsy on 1/7/25 of the right upper lobe showed adenocarcinoma.  She was discharged to [facility] and ultimately discharged home. PET CT on 2/24/25 showed al lesions had FDG avidity, but no other areas of FDG uptake.  She had a subsequent biopsy of the left upper lobe lesion on 3/11/25 that also showed adenocarcinoma. Upon review of the slides, pathology felt these were separate primary lung cancers.  Today, she reports feeling ok. She notes that she is still unsteady on her feet, but this has been present even prior to her back surgery. She is still having some back pain and was prescribed tramadol by Dr. Lozano.  She is able to go up one flight of stairs, but struggles a bit with two. She reports some mild dyspnea and chest "heaviness" with exertion.  Notes occasional palpitations. She denies nausea, vomiting, fevers, chills, or chest pain. She drinks 7 beers/week. She has never had withdrawal.

## 2025-04-01 NOTE — HISTORY OF PRESENT ILLNESS
[FreeTextEntry1] : Ms. Kassi Cifuentes is a very pleasant 65-year-old female 50 pack year smoker with a history of hypothyroidism, HLD brain aneurysm s/p clipping 2008,  who was found during workup for back pain to have bilateral lung masses, now biopsy proven separate primary adenocarcinomas and referred by Dr. Lozano for management.  She presented to [place] [facility] in the emergency room on 12/27/2024, due to severe back pain.  Workup during that time revealed bilateral lung nodules (right upper lobe 3.7cm right upper lobe cavitary mass, 1cm left upper lobe spiculated nodule, 1.2cm right lower lobe nodule, and a 0.8cm left lower lobe nodule) as well as a T11 compression fracture. She had a kyphoplasty and bone biopsy with Dr. Bernard on 1/6/25. IR lung biopsy on 1/7/25 of the right upper lobe showed adenocarcinoma.  She was discharged to [facility] and ultimately discharged home. PET CT on 2/24/25 showed al lesions had FDG avidity, but no other areas of FDG uptake.  She had a subsequent biopsy of the left upper lobe lesion on 3/11/25 that also showed adenocarcinoma. Upon review of the slides, pathology felt these were separate primary lung cancers.  Today, she reports feeling ok. She notes that she is still unsteady on her feet, but this has been present even prior to her back surgery. She is still having some back pain and was prescribed tramadol by Dr. Lozano.  She is able to go up one flight of stairs, but struggles a bit with two. She reports some mild dyspnea and chest "heaviness" with exertion.  Notes occasional palpitations. She denies nausea, vomiting, fevers, chills, or chest pain. She drinks 7 beers/week. She has never had withdrawal.

## 2025-05-06 NOTE — HISTORY OF PRESENT ILLNESS
[FreeTextEntry1] : 4/1/2025 Ms. Kassi Cifuentes is a very pleasant 65-year-old female 50 pack year smoker with a history of hypothyroidism, HLD brain aneurysm s/p clipping 2008, who was found during workup for back pain to have bilateral lung masses, now biopsy proven separate primary adenocarcinomas and referred by Dr. Lozano for management.  She presented to [facility] in the emergency room on 12/27/2024, due to severe back pain. Workup during that time revealed bilateral lung nodules (right upper lobe 3.7cm right upper lobe cavitary mass, 1cm left upper lobe spiculated nodule, 1.2cm right lower lobe nodule, and a 0.8cm left lower lobe nodule) as well as a T11 compression fracture. She had a kyphoplasty and bone biopsy with Dr. Bernard on 1/6/25. IR lung biopsy on 1/7/25 of the right upper lobe showed adenocarcinoma. She was discharged to [facility] and ultimately discharged home. PET CT on 2/24/25 showed al lesions had FDG avidity, but no other areas of FDG uptake. She had a subsequent biopsy of the left upper lobe lesion on 3/11/25 that also showed adenocarcinoma. Upon review of the slides, pathology felt these were separate primary lung cancers.  I had first met her on 4/1/25 when she endorsed that she is unsteady on her feet even prior to her back surgery, has issues with back pain and is on tramadol, is able to go up one flight of stairs but struggles with two, has some dyspnea and chest heaviness with exertion and occasional palpitations.  She reported drinking 7 beers/week. I had discussed the option of resecting the right upper lobe lesion given that I felt these were multiple primary lung cancers and could be treated as such.  Today, she reports feeling ok with no new complaints.  Her dyspnea on exertion is unchanged.

## 2025-05-06 NOTE — HISTORY OF PRESENT ILLNESS
[FreeTextEntry1] : 4/1/2025 Ms. Kassi Cifuentes is a very pleasant 65-year-old female 50 pack year smoker with a history of hypothyroidism, HLD brain aneurysm s/p clipping 2008, who was found during workup for back pain to have bilateral lung masses, now biopsy proven separate primary adenocarcinomas and referred by Dr. Lozano for management.  She presented to [place] in the emergency room on 12/27/2024, due to severe back pain. Workup during that time revealed bilateral lung nodules (right upper lobe 3.7cm right upper lobe cavitary mass, 1cm left upper lobe spiculated nodule, 1.2cm right lower lobe nodule, and a 0.8cm left lower lobe nodule) as well as a T11 compression fracture. She had a kyphoplasty and bone biopsy with Dr. Bernard on 1/6/25. IR lung biopsy on 1/7/25 of the right upper lobe showed adenocarcinoma. She was discharged to [facility] and ultimately discharged home. PET CT on 2/24/25 showed al lesions had FDG avidity, but no other areas of FDG uptake. She had a subsequent biopsy of the left upper lobe lesion on 3/11/25 that also showed adenocarcinoma. Upon review of the slides, pathology felt these were separate primary lung cancers.  I had first met her on 4/1/25 when she endorsed that she is unsteady on her feet even prior to her back surgery, has issues with back pain and is on tramadol, is able to go up one flight of stairs but struggles with two, has some dyspnea and chest heaviness with exertion and occasional palpitations.  She reported drinking 7 beers/week. I had discussed the option of resecting the right upper lobe lesion given that I felt these were multiple primary lung cancers and could be treated as such.  Today, she reports feeling ok with no new complaints.  Her dyspnea on exertion is unchanged.

## 2025-05-06 NOTE — DATA REVIEWED
[FreeTextEntry1] : CT Coronary 5/2/25 Cardiac: 1. The calcium score is severe at 548 Agatston units, which is at the 96 percentile, adjusted for age, gender and race. 2. Calcific plaque obscures the lumen mid LAD precluding stenosis evaluation. 3. Mild to moderate stenosis of the proximal LAD> 4. Remaining segments demonstrate non obstructive coronary artery disease.  Based on the coronary findings, FFR-CT Coronary Analysis ([employer]) is recommended to define the physiologic significance of obstructive coronary disease. After insurance approval, CCTA images will be sent for analysis. FFR CT results are then generally available within 24 hours(if technical quality of the images is sufficient).  Non-cardiac: Further increase in size of thick-walled and irregular cavitary right upper lobe lesion suspicious for neoplasm. No gross change in left upper lobe nodule, posterior right lower lobe nodule, and medial left lower lobe nodule.  TTE 4/28/25: CONCLUSIONS:  1. Normal left ventricular size, systolic and diastolic function. 2. Normal right ventricular size and systolic function. 3. No significant valvular disease seen.  PFTs 4/7/25: FEV1 75% DLCO 39%

## 2025-05-06 NOTE — PHYSICAL EXAM
[Sclera] : the sclera and conjunctiva were normal [Neck Appearance] : the appearance of the neck was normal [] : no respiratory distress [Exaggerated Use Of Accessory Muscles For Inspiration] : no accessory muscle use [FreeTextEntry1] : Walks with a cane and with assistance [Oriented To Time, Place, And Person] : oriented to person, place, and time [Impaired Insight] : insight and judgment were intact [Affect] : the affect was normal [Mood] : the mood was normal

## 2025-05-06 NOTE — ASSESSMENT
[FreeTextEntry1] : Kassi Cifuentes is a very pleasant 65 year old woman, current 50 pack year smoker, with a history of hypothyroidism, HLD, brain aneurysm status post clipping 2008, now found during treatment of a T11 compression fracture to have biopsy proven bilateral upper lobe lung cancers and FDG avid lesions in the lower lobes.  While I was hoping I would be able to resect the right sided cancers with resection and subsequently treat the left sided lesions either with surgery or radiation, the patient has decided against surgery.  Furthermore, her DLCO is 39% which is rather marginal.  Taking into account her issues with mobility, significant calcium score on her CT coronaries, and concern for stenosis for which an FFR is ordered and we are awaiting, I am not sure the risks outweigh the benefits of doing an operation and I think it is very reasonable to decline surgery and reassured her of this point.  I will reach out to Dr. Lozano and Dr. Mckenzie to see how she could be treated for her lung cancers. I impressed on her the importance of not waiting too long to get treatment.    Given her PFT results, I also recommended that she see a pulmonologist and will see if we can get her plugged in with one nearby so that her lung disease can be better treated.  She is also plugged in with Dr. Dickerson from [facility] and I will defer to him on managing the final results of the FFR.  I will plan to see her on an as needed basis. She knows to reach out with any questions or concerns.  Thank you for involving us in the care of this patient. Please feel free to reach out with any questions or concerns.  Robert Hogan MD Thoracic Surgery.

## 2025-05-06 NOTE — ASSESSMENT
[FreeTextEntry1] : Kassi Cifuentes is a very pleasant 65 year old woman, current 50 pack year smoker, with a history of hypothyroidism, HLD, brain aneurysm status post clipping 2008, now found during treatment of a T11 compression fracture to have biopsy proven bilateral upper lobe lung cancers and FDG avid lesions in the lower lobes.  While I was hoping I would be able to resect the right sided cancers with resection and subsequently treat the left sided lesions either with surgery or radiation, the patient has decided against surgery.  Furthermore, her DLCO is 39% which is rather marginal.  Taking into account her issues with mobility, significant calcium score on her CT coronaries, and concern for stenosis for which an FFR is ordered and we are awaiting, I am not sure the risks outweigh the benefits of doing an operation and I think it is very reasonable to decline surgery and reassured her of this point.  I will reach out to Dr. Lozano and Dr. Mckenzie to see how she could be treated for her lung cancers. I impressed on her the importance of not waiting too long to get treatment.    Given her PFT results, I also recommended that she see a pulmonologist and will see if we can get her plugged in with one nearby so that her lung disease can be better treated.  She is also plugged in with Dr. Dickerson from [facility] and I will defer to him on managing the final results of the FFR.  I will plan to see her on an as needed basis. She knows to reach out with any questions or concerns.  Thank you for involving us in the care of this patient. Please feel free to reach out with any questions or concerns.  Robert Hogan MD Thoracic Surgery.

## 2025-05-06 NOTE — HISTORY OF PRESENT ILLNESS
[FreeTextEntry1] : 4/1/2025 Ms. Kassi Cifuentes is a very pleasant 65-year-old female 50 pack year smoker with a history of hypothyroidism, HLD brain aneurysm s/p clipping 2008, who was found during workup for back pain to have bilateral lung masses, now biopsy proven separate primary adenocarcinomas and referred by Dr. Lozano for management.  She presented to [place] in the emergency room on 12/27/2024, due to severe back pain. Workup during that time revealed bilateral lung nodules (right upper lobe 3.7cm right upper lobe cavitary mass, 1cm left upper lobe spiculated nodule, 1.2cm right lower lobe nodule, and a 0.8cm left lower lobe nodule) as well as a T11 compression fracture. She had a kyphoplasty and bone biopsy with Dr. Bernard on 1/6/25. IR lung biopsy on 1/7/25 of the right upper lobe showed adenocarcinoma. She was discharged to [facility] and ultimately discharged home. PET CT on 2/24/25 showed al lesions had FDG avidity, but no other areas of FDG uptake. She had a subsequent biopsy of the left upper lobe lesion on 3/11/25 that also showed adenocarcinoma. Upon review of the slides, pathology felt these were separate primary lung cancers.  I had first met her on 4/1/25 when she endorsed that she is unsteady on her feet even prior to her back surgery, has issues with back pain and is on tramadol, is able to go up one flight of stairs but struggles with two, has some dyspnea and chest heaviness with exertion and occasional palpitations.  She reported drinking 7 beers/week. I had discussed the option of resecting the right upper lobe lesion given that I felt these were multiple primary lung cancers and could be treated as such.  Today, she reports feeling ok with no new complaints.  Her dyspnea on exertion is unchanged.

## 2025-05-06 NOTE — DATA REVIEWED
[FreeTextEntry1] : CT Coronary 5/2/25 Cardiac: 1. The calcium score is severe at 548 Agatston units, which is at the 96 percentile, adjusted for age, gender and race. 2. Calcific plaque obscures the lumen mid LAD precluding stenosis evaluation. 3. Mild to moderate stenosis of the proximal LAD> 4. Remaining segments demonstrate non obstructive coronary artery disease.  Based on the coronary findings, FFR-CT Coronary Analysis ([employer]) is recommended to define the physiologic significance of obstructive coronary disease. After insurance approval, CCTA images will be sent for analysis. FFR CT results are then generally available within 24 hours(if technical quality of the images is sufficient).  Non-cardiac: Further increase in size of thick-walled and irregular cavitary right upper lobe lesion suspicious for neoplasm. No gross change in left upper lobe nodule, posterior right lower lobe nodule, and medial left lower lobe nodule.  TTE 4/28/25: CONCLUSIONS:  1. Normal left ventricular size, systolic and diastolic function. 2. Normal right ventricular size and systolic function. 3. No significant valvular disease seen.  PFTs 4/7/25: FEV1 75% DLCO 39%

## 2025-05-06 NOTE — ASSESSMENT
[FreeTextEntry1] : Kassi Cifuentes is a very pleasant 65 year old woman, current 50 pack year smoker, with a history of hypothyroidism, HLD, brain aneurysm status post clipping 2008, now found during treatment of a T11 compression fracture to have biopsy proven bilateral upper lobe lung cancers and FDG avid lesions in the lower lobes.  While I was hoping I would be able to resect the right sided cancers with resection and subsequently treat the left sided lesions either with surgery or radiation, the patient has decided against surgery.  Furthermore, her DLCO is 39% which is rather marginal.  Taking into account her issues with mobility, significant calcium score on her CT coronaries, and concern for stenosis for which an FFR is ordered and we are awaiting, I am not sure the risks outweigh the benefits of doing an operation and I think it is very reasonable to decline surgery and reassured her of this point.  I will reach out to Dr. Lozano and Dr. Mckenzie to see how she could be treated for her lung cancers. I impressed on her the importance of not waiting too long to get treatment.    Given her PFT results, I also recommended that she see a pulmonologist and will see if we can get her plugged in with one nearby so that her lung disease can be better treated.  She is also plugged in with Dr. Dickerson from [facility] and I will defer to him on managing the final results of the FFR.  I will plan to see her on an as needed basis. She knows to reach out with any questions or concerns.  Thank you for involving us in the care of this patient. Please feel free to reach out with any questions or concerns.  Robert Hogan MD Thoracic Surgery.

## 2025-05-06 NOTE — DATA REVIEWED
[FreeTextEntry1] : CT Coronary 5/2/25 Cardiac: 1. The calcium score is severe at 548 Agatston units, which is at the 96 percentile, adjusted for age, gender and race. 2. Calcific plaque obscures the lumen mid LAD precluding stenosis evaluation. 3. Mild to moderate stenosis of the proximal LAD> 4. Remaining segments demonstrate non obstructive coronary artery disease.  Based on the coronary findings, FFR-CT Coronary Analysis ([employer]) is recommended to define the physiologic significance of obstructive coronary disease. After insurance approval, CCTA images will be sent for analysis. FFR CT results are then generally available within 24 hours(if technical quality of the images is sufficient).  Non-cardiac: Further increase in size of thick-walled and irregular cavitary right upper lobe lesion suspicious for neoplasm. No gross change in left upper lobe nodule, posterior right lower lobe nodule, and medial left lower lobe nodule.  TTE 4/28/25: CONCLUSIONS:  1. Normal left ventricular size, systolic and diastolic function. 2. Normal right ventricular size and systolic function. 3. No significant valvular disease seen.  PFTs 4/7/25: FEV1 75% DLCO 39%

## 2025-05-16 NOTE — DISEASE MANAGEMENT
[Clinical] : TNM Stage: c [II] : II [FreeTextEntry4] : Synchronous bilateral right and left upper lung cancer [TTNM] : 2 [NTNM] : 0 [MTNM] : x

## 2025-05-16 NOTE — HISTORY OF PRESENT ILLNESS
[FreeTextEntry1] : Ms. Cifuentes is present for a consultation to discuss definitive radiation therapy to the right and left lung, she is presenting with synchronous adenocarcinoma primaries of the T2N0 right and T1N0 left upper lobe.  She presented to [facility] ED 2024 with a complaint of back pain and difficulty ambulating.  Further evaluation revealed:   CT C/A/P (1/3/25)  2.4 x 3.7 cm right upper lobe mass concerning for malignancy.  1 cm left upper lobe spiculated nodule suspicious for malignancy.  Emphysema.  Mild right hilar adenopathy indeterminate for malignancy.  Constipated colon.  Age-indeteminate T11 compression fracture with 3 to 4 mm osseous retropulsion, possibly acute or chronic.   25 VERTREBRAL BODY, T11, BIOPSY: -  Bone with reactive changes of the marrow and evidence of bone remodeling -  Negative for carcinoma  2025 A. LUNG, RIGHT UPPER LOBE, BIOPSY: Poorly-differentiated carcinoma, most favored adenocarcinoma of lung origin. Immunoprofile of carcinoma (A1): P63 and P40: Patchy to no staining of carcinoma, respectively TTF1: Patchy strong staining of carcinoma Kras +, high level of tumor mutation was present, microsatellite instability not detected.  25 - CT HEAD  No intracranial metastasis.   PET SKUL-THI ONC FDG INIT (25) 1. Hypermetabolic 4 cm cavitary lung mass in the right upper lobe, corresponding to known malignancy. No hypermetabolic lymphadenopathy. 2. Hypermetabolic 1.0 cm spiculated lung nodule in the lateral left upper lobe, concerning for malignancy. 3. FDG uptake in a 1.2 cm right lower lobe nodule and a 0.8 cm left lower lobe nodule, both of which may represent infectious/inflammatory etiology, however malignancy cannot be excluded. 4. No additional sites of pathologic FDG uptake to suggest biologic tumor activity.  On 3/11/25, a biopsy of the left upper lobe lesion was performed that revealed: adenocarcinoma, TTF-1 and CK7+, P40-, PDL-1 positive. Tumor mutation burden was low.  CTA HEART IC (25)  Further increase in size of thick-walled and irregular cavitary right upper lobe lesion suspicious for neoplasm. No gross change in left upper lobe nodule, posterior right lower lobe nodule, and medial left lower lobe nodule.  Social Hx:  Smokin/2 pack/day starting from when she was 15 years old until 24 until she was admitted and is now wearing a patch. ETOH: beer 1-2 cups daily Illicit drug use: Never

## 2025-05-16 NOTE — PHYSICAL EXAM
[No Acute Distress] : no acute distress [Normal Appearance] : normal appearance [Normal S1, S2] : normal s1, s2 [No Resp Distress] : no resp distress [Clear to Auscultation Bilaterally] : clear to auscultation bilaterally [No Abnormalities] : no abnormalities [No Clubbing] : no clubbing [No Edema] : no edema [No Focal Deficits] : no focal deficits [Oriented x3] : oriented x3 [Normal Affect] : normal affect

## 2025-05-16 NOTE — VITALS
[Maximal Pain Intensity: 7/10] : 7/10 [Least Pain Intensity: 5/10] : 5/10 [Pain Location: ___] : Pain Location: [unfilled] [Opioid] : opioid [80: Normal activity with effort; some signs or symptoms of disease.] : 80: Normal activity with effort; some signs or symptoms of disease.  [Date: ____________] : Patient's last distress assessment performed on [unfilled]. [7 - Distress Level] : Distress Level: 7 [Referred Patient  to social work for follow-up] : Patient was referred to social work for follow-up

## 2025-05-16 NOTE — LETTER CLOSING
[Consult Closing:] : Thank you for allowing me to participate in the care of this patient.  If you have any questions, please do not hesitate to contact me. [Sincerely yours,] : Sincerely yours, [FreeTextEntry3] : Martha Mckenzie MD

## 2025-05-16 NOTE — HISTORY OF PRESENT ILLNESS
[FreeTextEntry1] : Ms. Cifuentes is present for a consultation to discuss definitive radiation therapy to the right and left lung, she is presenting with synchronous adenocarcinoma primaries of the T2N0 right and T1N0 left upper lobe.  She presented to [place] ED 2024 with a complaint of back pain and difficulty ambulating.  Further evaluation revealed:   CT C/A/P (1/3/25)  2.4 x 3.7 cm right upper lobe mass concerning for malignancy.  1 cm left upper lobe spiculated nodule suspicious for malignancy.  Emphysema.  Mild right hilar adenopathy indeterminate for malignancy.  Constipated colon.  Age-indeteminate T11 compression fracture with 3 to 4 mm osseous retropulsion, possibly acute or chronic.   25 VERTREBRAL BODY, T11, BIOPSY: -  Bone with reactive changes of the marrow and evidence of bone remodeling -  Negative for carcinoma  2025 A. LUNG, RIGHT UPPER LOBE, BIOPSY: Poorly-differentiated carcinoma, most favored adenocarcinoma of lung origin. Immunoprofile of carcinoma (A1): P63 and P40: Patchy to no staining of carcinoma, respectively TTF1: Patchy strong staining of carcinoma Kras +, high level of tumor mutation was present, microsatellite instability not detected.  25 - CT HEAD  No intracranial metastasis.   PET SKUL-THI ONC FDG INIT (25) 1. Hypermetabolic 4 cm cavitary lung mass in the right upper lobe, corresponding to known malignancy. No hypermetabolic lymphadenopathy. 2. Hypermetabolic 1.0 cm spiculated lung nodule in the lateral left upper lobe, concerning for malignancy. 3. FDG uptake in a 1.2 cm right lower lobe nodule and a 0.8 cm left lower lobe nodule, both of which may represent infectious/inflammatory etiology, however malignancy cannot be excluded. 4. No additional sites of pathologic FDG uptake to suggest biologic tumor activity.  On 3/11/25, a biopsy of the left upper lobe lesion was performed that revealed: adenocarcinoma, TTF-1 and CK7+, P40-, PDL-1 positive. Tumor mutation burden was low.  CTA HEART IC (25)  Further increase in size of thick-walled and irregular cavitary right upper lobe lesion suspicious for neoplasm. No gross change in left upper lobe nodule, posterior right lower lobe nodule, and medial left lower lobe nodule.  Social Hx:  Smokin/2 pack/day starting from when she was 15 years old until 24 until she was admitted and is now wearing a patch. ETOH: beer 1-2 cups daily Illicit drug use: Never

## 2025-05-16 NOTE — REVIEW OF SYSTEMS
[SOB on Exertion] : sob on exertion [Palpitations] : palpitations [Back Pain] : back pain [Anxiety] : anxiety [Negative] : Genitourinary

## 2025-05-28 NOTE — PHYSICAL EXAM
[Ambulatory and capable of all self care but unable to carry out any work activities] : Status 2- Ambulatory and capable of all self care but unable to carry out any work activities. Up and about more than 50% of waking hours [Normal] : grossly intact [de-identified] : Walks with cane [de-identified] : Wearing spine support

## 2025-05-28 NOTE — HISTORY OF PRESENT ILLNESS
[de-identified] : Ms. Kassi Cifuentes is 67 y/o female with right upper lung adenocarcinoma here for hospital follow up  Patient with hx of intracranial aneurysm (diag ) s/p clipping at [facility], active smoker who presented at [facility] ED 2024 c/o back pain and difficulty ambulating.  2024 CT Lumbar Age indeterminate mild anterior wedge compression fracture deformity of T11. Mild retropulsion of the posterosuperior corner of the vertebral body into the spinal canal. 4.6 cm indeterminate low-attenuation lesion within the spleen. A neoplastic lesion is not excluded. Recommend a dedicated contrast-enhanced abdomen and pelvis CT study for further evaluation.  1/3/2025 CT C/A/P showed 2.4 x 3.7 cm right upper lobe cavitary mass with irregular nodular wall thickening concerning for malignancy. Infection less likely however is not excluded. 1 cm left upper lobe spiculated nodule suspicious for malignancy (3:47). Emphysema.Mild right hilar adenopathy indeterminate for malignancy.4.3 x 3.1 x 3.7 cm septated splenic cyst.Constipated colon. Colonic diverticulosis without diverticulitis. Age-indeterminate T11 compression fracture with 3 to 4 mm osseous retropulsion, possibly acute on chronic.   2025 CT Thoracic 1.   T11 vertebral body compression fracture deformity with 3-4 mm retropulsion and loss of 50% of vertebral body height, indeterminate in age but possibly acute on chronic. 2.   Incompletely visualized irregular consolidation density parenchymal  opacity in the right upper lobe an incompletely visualized nodular  consolidation density in the left upper lobe. Recommend chest CT for further  evaluation.  1/3/2025 bone scan Focal uptake across vertebral body T11, corresponding to the compression deformity seen on recent CT. Of note, fractures on bone scan can demonstrate uptake for many months.  2025 CT head No CT evidence of intracranial metastasis.  2025  VERTREBRAL BODY, T11, BIOPSY: 	-  Bone with reactive changes of the marrow and evidence of bone remodeling 	-  Negative for carcinoma Negative: Pancytokeratin, cytokeratin 7 and TTF1   2025 A. LUNG, RIGHT UPPER LOBE, BIOPSY: Poorly-differentiated carcinoma, most favored adenocarcinoma of lung origin.   Immunoprofile of carcinoma (A1): P63 and P40: Patchy to no staining of carcinoma, respectively TTF1: Patchy strong staining of carcinoma   Patient is a 65-year-old female with a PMHx of hypothyroidism and HLD who presented to [facility] in the emergency room due to severe back pain that led to the incidental findings of her lung mass on 25. Patient cannot recall how she was referred to hematology-oncology. Patient is a poor historian.  Menarche: 14 years old  Menopause: Patient stated her menopause at an early age of 37.  G2,P0 Hysterectomy: Never  Oophorectomy: Never  Oral Contraceptives: 18 years old till about 36years old  Hormone Replacement Therapy: Last pap/pelvic exam: 35 years ago  Related family history:  Occupation: Customer service for [employer]   Genetic testing: Never   Diet: Regular everything but seafood  Previous Tx Hx: N/A Health Maintenance: Colonoscopy: 25 years ago  Endoscopy:  26 years ago  Mammo/US: 25 years ago Gyn: 25 years ago  DVTs: N/A Anticoagulant Hx: n/a Sleep Apnea: Yes  Recent Hospitalizations: 25 - 25 to a rehab facility ([facility] and nursing [place])  Vaccinations: Flu & Covid Vaccinated    Past med hx: T11 fracture    Past sug hx: Brain Aneurysm open brain surgery 10/2008  Related family history: Mother:  at 81yo. Patient doesn't remember cause of death  Father:  at 87 yo. Patient passed away from a PE Brother:  at 54 years old from colon cancer.  Brother:  at 68 years old from heart attack Cousin:  from lung cancer    Smokin/2 pack/day starting from when she was 15 years old until 24 until she was admitted and is now wearing a patch. ETOH: beer 1-2 cups daily  Illicit drug use: Never     Reports that her rehab was delayed due to norovirus GI infection x 4 days  Lung, Right Upper Lobe, biopsy Poorly-differentiated carcinoma, mostly favored adenocarcnima of lung origin.   VERTREBRAL BODY, T11, BIOPSY: -  Bone with reactive changes of the marrow and evidence of bone remodeling -  Negative for carcinoma      [de-identified] : Patient is seen today for follow up She is accompanied by her friend Yasmine  She is s/p lung biopsy CT guided with Dr. Costello. She is accompanied by her friend Yasmine Lung Biopsy 3/11/25: LEFT UPPER LOBE LUNG, BIOPSY: - Adenocarcinoma in a background of inflammatory fibrosis and fragments of benign alveolar tissue; see comment -Immunohistochemical stains TTF-1 and CK7 are positive in tumor cells. P40 is negative. -PD-L1 Immunohistochemistry Antibody: [employer] PDL1 () Tissue type: Lung Block: A1 Result: Tumor Proportion Score (TPS*) 3% Interpretation: Positive

## 2025-05-28 NOTE — ASSESSMENT
[FreeTextEntry1] : Right upper lobe lung poorly differentiated adenocarcinoma PD-L1 70% NGS: [id number], CDKN2A, Tp53, STK 11 mutations.  TMB high.  FABI CT CAP [1/3/2025]: 3.7 cm right upper lobe cavitary mass, mild right hilar adenopathy.  Age indeterminate T11 compression fracture.  Left upper lobe spiculated nodule 1 cm CT head: No evidence of metastasis Cannot get MRI due to intracranial aneurysm clip PET/CT [2025]: 4 cm right upper lobe lung mass, 1.2 cm right lower lobe nodule, 1 cm left lateral upper lobe nodule, 0.8 cm left lower lobe nodule.  No additional sites of pathological FDG uptake  Left upper lobe adenocarcinoma PD-L1 3% NGS panel pending  I discussed the findings with pathology.  We reviewed the 2 lesions and histologically the 2 lesions look slightly different.  We will send NGS panel on the left upper lobe adenocarcinoma, and if it is not K-emiliana G 12C mutated, there likely 2 different primary. I explained to the patient that she likely has 2 different primaries.  However mutations do not deter mine primary versus metastatic disease I explained to the patient that the bone lesions did not light up.  She has 4 nodules out of which right upper lobe and the left upper lobe are malignant  The right lower lobe nodule and the left lower lobe nodule could be infectious versus inflammatory and malignancy cannot be ruled out Explained to the patient the best neck step would be to be seen by thoracic surgery and discussed about resection of the 2 lung cancers Patient is apprehensive about surgery and initially did not want to pursue it.  Extensive counseling done by myself and her friend at bedside I explained to the patient at least to have a visit with thoracic surgery to discuss the option, procedure, risks and recovery I explained to her that the only curative treatment for her lung cancer is surgery. If she were to decline surgery, we can consider chemotherapy plus checkpoint inhibitors but this may not be curable.  We can also consider local therapy to bilateral lung masses if she were a candidate including cryoablation versus radiation. She understands After back-and-forth, she was agreeable to seeing surgeon Plan: Refer to thoracic surgery Smoking cessation counseled at length  Social history: Currently in [facility] rehab Lives alone in [place].  Has 2 sister 1 of whom lives nearby Smokin/2 pack/day starting from when she was 15 years old until 12/27/24 until she was admitted and is now wearing a patch. ETOH: beer 1-2 cups daily Illicit drug use: Never.  Patient and friend Yasmine had multiple questions which were answered to satisfaction  Follow-up after the visit with thoracic surgery next  No labs

## 2025-07-17 NOTE — VITALS
[Maximal Pain Intensity: 7/10] : 7/10 [Least Pain Intensity: 5/10] : 5/10 [Pain Location: ___] : Pain Location: [unfilled] [Opioid] : opioid [80: Normal activity with effort; some signs or symptoms of disease.] : 80: Normal activity with effort; some signs or symptoms of disease.

## 2025-07-18 NOTE — REVIEW OF SYSTEMS
[Negative] : Neurological [Fatigue: Grade 0] : Fatigue: Grade 0 [Cough: Grade 0] : Cough: Grade 0 [Dyspnea: Grade 0] : Dyspnea: Grade 0

## 2025-07-22 NOTE — PHYSICAL EXAM
[Normal] : no focal deficits [Oriented To Time, Place, And Person] : oriented to person, place, and time [de-identified] : telehealth

## 2025-07-22 NOTE — PHYSICAL EXAM
[Normal] : no focal deficits [Oriented To Time, Place, And Person] : oriented to person, place, and time [de-identified] : telehealth

## 2025-07-22 NOTE — HISTORY OF PRESENT ILLNESS
[Home] : at home, [unfilled] , at the time of the visit. [Medical Office: ([facility])___] : at the medical office located in  [Telephone (audio)] : This telephonic visit was provided via audio only technology. [Verbal consent obtained from patient] : the patient, [unfilled] [FreeTextEntry1] :  Ms. Cifuentes is present for a consultation to discuss definitive radiation therapy to the right and left lung; she is presenting with synchronous adenocarcinoma primaries of the T2N0 right and T1N0 left upper lobe.   She presented to [place] ED 2024 with a complaint of back pain and difficulty ambulating.  Further evaluation revealed:  CT C/A/P (1/3/25): 2.4 x 3.7 cm right upper lobe mass concerning for malignancy. 1 cm left upper lobe spiculated nodule suspicious for malignancy. Emphysema. Mild right hilar adenopathy indeterminate for malignancy. Constipated colon. Age-indeteminate T11 compression fracture with 3 to 4 mm osseous retropulsion, possibly acute or chronic.   25 VERTREBRAL BODY, T11, BIOPSY: -  Bone with reactive changes of the marrow and evidence of bone remodeling -  Negative for carcinoma  2025 A. LUNG, RIGHT UPPER LOBE, BIOPSY: Poorly-differentiated carcinoma, most favored adenocarcinoma of lung origin. Immunoprofile of carcinoma (A1): P63 and P40: Patchy to no staining of carcinoma, respectively TTF1: Patchy strong staining of carcinoma Kras +, high level of tumor mutation was present, microsatellite instability not detected.  25 - CT HEAD  No intracranial metastasis.   PET SKUL-THI ONC FDG INIT (25) 1. Hypermetabolic 4 cm cavitary lung mass in the right upper lobe, corresponding to known malignancy. No hypermetabolic lymphadenopathy. 2. Hypermetabolic 1.0 cm spiculated lung nodule in the lateral left upper lobe, concerning for malignancy. 3. FDG uptake in a 1.2 cm right lower lobe nodule and a 0.8 cm left lower lobe nodule, both of which may represent infectious/inflammatory etiology, however malignancy cannot be excluded. 4. No additional sites of pathologic FDG uptake to suggest biologic tumor activity.  On 3/11/25, a biopsy of the left upper lobe lesion was performed that revealed: adenocarcinoma, TTF-1 and CK7+, P40-, PDL-1 positive. Tumor mutation burden was low.  CTA HEART IC (25)  Further increase in size of thick-walled and irregular cavitary right upper lobe lesion suspicious for neoplasm. No gross change in left upper lobe nodule, posterior right lower lobe nodule, and medial left lower lobe nodule.  Social Hx:  Smokin/2 pack/day starting from when she was 15 years old until 24 until she was admitted and is now wearing a patch. ETOH: beer 1-2 cups daily Illicit drug use: Never  She completed radiation SBRT to the bilateral lung to a total of 50 Gy each on 25. She reports she was in the ER for pain to the bilateral torso. All imaging did not present any issue. She was released with pain medication. She had follow-up by Dr. Lozano on 25.  She was supposed to start nivolumab, but schedule wise it did not work out for her. She reports SOB with chronic COPD, and she is under the care of Dr. Reynaga.  She denies any cough, wheezing, or hemoptysis. She reports appetite is good and weight is stable. Overall, she is doing well.

## 2025-07-22 NOTE — HISTORY OF PRESENT ILLNESS
[Home] : at home, [unfilled] , at the time of the visit. [Medical Office: ([facility])___] : at the medical office located in  [Telephone (audio)] : This telephonic visit was provided via audio only technology. [Verbal consent obtained from patient] : the patient, [unfilled] [FreeTextEntry1] :  Ms. Cifuentes is present for a consultation to discuss definitive radiation therapy to the right and left lung; she is presenting with synchronous adenocarcinoma primaries of the T2N0 right and T1N0 left upper lobe.   She presented to [place] ED 2024 with a complaint of back pain and difficulty ambulating.  Further evaluation revealed:  CT C/A/P (1/3/25): 2.4 x 3.7 cm right upper lobe mass concerning for malignancy. 1 cm left upper lobe spiculated nodule suspicious for malignancy. Emphysema. Mild right hilar adenopathy indeterminate for malignancy. Constipated colon. Age-indeteminate T11 compression fracture with 3 to 4 mm osseous retropulsion, possibly acute or chronic.   25 VERTREBRAL BODY, T11, BIOPSY: -  Bone with reactive changes of the marrow and evidence of bone remodeling -  Negative for carcinoma  2025 A. LUNG, RIGHT UPPER LOBE, BIOPSY: Poorly-differentiated carcinoma, most favored adenocarcinoma of lung origin. Immunoprofile of carcinoma (A1): P63 and P40: Patchy to no staining of carcinoma, respectively TTF1: Patchy strong staining of carcinoma Kras +, high level of tumor mutation was present, microsatellite instability not detected.  25 - CT HEAD  No intracranial metastasis.   PET SKUL-THI ONC FDG INIT (25) 1. Hypermetabolic 4 cm cavitary lung mass in the right upper lobe, corresponding to known malignancy. No hypermetabolic lymphadenopathy. 2. Hypermetabolic 1.0 cm spiculated lung nodule in the lateral left upper lobe, concerning for malignancy. 3. FDG uptake in a 1.2 cm right lower lobe nodule and a 0.8 cm left lower lobe nodule, both of which may represent infectious/inflammatory etiology, however malignancy cannot be excluded. 4. No additional sites of pathologic FDG uptake to suggest biologic tumor activity.  On 3/11/25, a biopsy of the left upper lobe lesion was performed that revealed: adenocarcinoma, TTF-1 and CK7+, P40-, PDL-1 positive. Tumor mutation burden was low.  CTA HEART IC (25)  Further increase in size of thick-walled and irregular cavitary right upper lobe lesion suspicious for neoplasm. No gross change in left upper lobe nodule, posterior right lower lobe nodule, and medial left lower lobe nodule.  Social Hx:  Smokin/2 pack/day starting from when she was 15 years old until 24 until she was admitted and is now wearing a patch. ETOH: beer 1-2 cups daily Illicit drug use: Never  She completed radiation SBRT to the bilateral lung to a total of 50 Gy each on 25. She reports she was in the ER for pain to the bilateral torso. All imaging did not present any issue. She was released with pain medication. She had follow-up by Dr. Lozano on 25.  She was supposed to start nivolumab, but schedule wise it did not work out for her. She reports SOB with chronic COPD, and she is under the care of Dr. Reynaga.  She denies any cough, wheezing, or hemoptysis. She reports appetite is good and weight is stable. Overall, she is doing well.

## 2025-07-22 NOTE — PHYSICAL EXAM
[Normal] : no focal deficits [Oriented To Time, Place, And Person] : oriented to person, place, and time [de-identified] : telehealth

## 2025-07-22 NOTE — HISTORY OF PRESENT ILLNESS
[Home] : at home, [unfilled] , at the time of the visit. [Medical Office: ([facility])___] : at the medical office located in  [Telephone (audio)] : This telephonic visit was provided via audio only technology. [Verbal consent obtained from patient] : the patient, [unfilled] [FreeTextEntry1] :  Ms. Cifuentes is present for a consultation to discuss definitive radiation therapy to the right and left lung; she is presenting with synchronous adenocarcinoma primaries of the T2N0 right and T1N0 left upper lobe.   She presented to [place] ED 2024 with a complaint of back pain and difficulty ambulating.  Further evaluation revealed:  CT C/A/P (1/3/25): 2.4 x 3.7 cm right upper lobe mass concerning for malignancy. 1 cm left upper lobe spiculated nodule suspicious for malignancy. Emphysema. Mild right hilar adenopathy indeterminate for malignancy. Constipated colon. Age-indeteminate T11 compression fracture with 3 to 4 mm osseous retropulsion, possibly acute or chronic.   25 VERTREBRAL BODY, T11, BIOPSY: -  Bone with reactive changes of the marrow and evidence of bone remodeling -  Negative for carcinoma  2025 A. LUNG, RIGHT UPPER LOBE, BIOPSY: Poorly-differentiated carcinoma, most favored adenocarcinoma of lung origin. Immunoprofile of carcinoma (A1): P63 and P40: Patchy to no staining of carcinoma, respectively TTF1: Patchy strong staining of carcinoma Kras +, high level of tumor mutation was present, microsatellite instability not detected.  25 - CT HEAD  No intracranial metastasis.   PET SKUL-THI ONC FDG INIT (25) 1. Hypermetabolic 4 cm cavitary lung mass in the right upper lobe, corresponding to known malignancy. No hypermetabolic lymphadenopathy. 2. Hypermetabolic 1.0 cm spiculated lung nodule in the lateral left upper lobe, concerning for malignancy. 3. FDG uptake in a 1.2 cm right lower lobe nodule and a 0.8 cm left lower lobe nodule, both of which may represent infectious/inflammatory etiology, however malignancy cannot be excluded. 4. No additional sites of pathologic FDG uptake to suggest biologic tumor activity.  On 3/11/25, a biopsy of the left upper lobe lesion was performed that revealed: adenocarcinoma, TTF-1 and CK7+, P40-, PDL-1 positive. Tumor mutation burden was low.  CTA HEART IC (25)  Further increase in size of thick-walled and irregular cavitary right upper lobe lesion suspicious for neoplasm. No gross change in left upper lobe nodule, posterior right lower lobe nodule, and medial left lower lobe nodule.  Social Hx:  Smokin/2 pack/day starting from when she was 15 years old until 24 until she was admitted and is now wearing a patch. ETOH: beer 1-2 cups daily Illicit drug use: Never  She completed radiation SBRT to the bilateral lung to a total of 50 Gy each on 25. She reports she was in the ER for pain to the bilateral torso. All imaging did not present any issue. She was released with pain medication. She had follow-up by Dr. Lozano on 25.  She was supposed to start nivolumab, but schedule wise it did not work out for her. She reports SOB with chronic COPD, and she is under the care of Dr. Reynaga.  She denies any cough, wheezing, or hemoptysis. She reports appetite is good and weight is stable. Overall, she is doing well.